# Patient Record
Sex: MALE | Race: WHITE
[De-identification: names, ages, dates, MRNs, and addresses within clinical notes are randomized per-mention and may not be internally consistent; named-entity substitution may affect disease eponyms.]

---

## 2017-01-18 ENCOUNTER — HOSPITAL ENCOUNTER (INPATIENT)
Dept: HOSPITAL 74 - YASAS | Age: 57
LOS: 5 days | Discharge: HOME | DRG: 773 | End: 2017-01-23
Attending: INTERNAL MEDICINE | Admitting: INTERNAL MEDICINE
Payer: COMMERCIAL

## 2017-01-18 VITALS — BODY MASS INDEX: 21.7 KG/M2

## 2017-01-18 DIAGNOSIS — M62.838: ICD-10-CM

## 2017-01-18 DIAGNOSIS — F11.23: Primary | ICD-10-CM

## 2017-01-18 DIAGNOSIS — M25.562: ICD-10-CM

## 2017-01-18 DIAGNOSIS — Z87.898: ICD-10-CM

## 2017-01-18 DIAGNOSIS — F17.210: ICD-10-CM

## 2017-01-18 DIAGNOSIS — F19.24: ICD-10-CM

## 2017-01-18 DIAGNOSIS — G89.29: ICD-10-CM

## 2017-01-18 DIAGNOSIS — G47.00: ICD-10-CM

## 2017-01-18 LAB
APPEARANCE UR: CLEAR
BILIRUB UR STRIP.AUTO-MCNC: NEGATIVE MG/DL
COLOR UR: YELLOW
HIV 1 & 2 AB: NEGATIVE
HIV 1 AGP24: NEGATIVE
KETONES UR QL STRIP: NEGATIVE
LEUKOCYTE ESTERASE UR QL STRIP.AUTO: NEGATIVE
NITRITE UR QL STRIP: NEGATIVE
PH UR: 5 [PH] (ref 5–8)
PROT UR QL STRIP: NEGATIVE
PROT UR QL STRIP: NEGATIVE
RBC # UR STRIP: NEGATIVE /UL
SP GR UR: 1.02 (ref 1–1.03)
UROBILINOGEN UR STRIP-MCNC: NEGATIVE E.U./DL (ref 0.2–1)

## 2017-01-18 PROCEDURE — HZ2ZZZZ DETOXIFICATION SERVICES FOR SUBSTANCE ABUSE TREATMENT: ICD-10-PCS | Performed by: INTERNAL MEDICINE

## 2017-01-18 RX ADMIN — ANALGESIC BALM SCH APPLIC: 1.74; 4.06 OINTMENT TOPICAL at 22:39

## 2017-01-18 RX ADMIN — Medication SCH MG: at 22:40
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
## 2017-01-19 LAB
ALBUMIN SERPL-MCNC: 4.6 G/DL (ref 3.4–5)
ALP SERPL-CCNC: 50 U/L (ref 45–117)
ALT SERPL-CCNC: 18 U/L (ref 12–78)
ANION GAP SERPL CALC-SCNC: 6 MMOL/L (ref 8–16)
AST SERPL-CCNC: 15 U/L (ref 15–37)
BILIRUB SERPL-MCNC: 0.5 MG/DL (ref 0.2–1)
CALCIUM SERPL-MCNC: 8.6 MG/DL (ref 8.5–10.1)
CO2 SERPL-SCNC: 30 MMOL/L (ref 21–32)
CREAT SERPL-MCNC: 1 MG/DL (ref 0.7–1.3)
DEPRECATED RDW RBC AUTO: 13.7 % (ref 11.9–15.9)
GLUCOSE SERPL-MCNC: 87 MG/DL (ref 74–106)
MCH RBC QN AUTO: 32.6 PG (ref 25.7–33.7)
MCHC RBC AUTO-ENTMCNC: 33.7 G/DL (ref 32–35.9)
MCV RBC: 96.7 FL (ref 80–96)
PLATELET # BLD AUTO: 188 K/MM3 (ref 134–434)
PMV BLD: 9.1 FL (ref 7.5–11.1)
PROT SERPL-MCNC: 7.6 G/DL (ref 6.4–8.2)
WBC # BLD AUTO: 6.7 K/MM3 (ref 4–10)

## 2017-01-19 RX ADMIN — ANALGESIC BALM SCH APPLIC: 1.74; 4.06 OINTMENT TOPICAL at 22:35

## 2017-01-19 RX ADMIN — Medication SCH MG: at 22:35

## 2017-01-19 RX ADMIN — Medication SCH TAB: at 10:33

## 2017-01-19 RX ADMIN — ANALGESIC BALM SCH APPLIC: 1.74; 4.06 OINTMENT TOPICAL at 10:33

## 2017-01-19 RX ADMIN — NICOTINE SCH: 21 PATCH TRANSDERMAL at 10:34
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
## 2017-01-19 NOTE — EKG
Test Reason : 

Blood Pressure : ***/*** mmHG

Vent. Rate : 052 BPM     Atrial Rate : 052 BPM

   P-R Int : 172 ms          QRS Dur : 088 ms

    QT Int : 454 ms       P-R-T Axes : 072 062 066 degrees

   QTc Int : 422 ms

 

SINUS BRADYCARDIA

OTHERWISE NORMAL ECG

NO PREVIOUS ECGS AVAILABLE

Confirmed by EVAN DIAZ, BEL ([id number]) on 1/19/2017 5:02:26 PM

 

Referred By: Cristhian Daigle           Confirmed By:BEL GORDON MD

## 2017-01-20 RX ADMIN — NICOTINE SCH: 21 PATCH TRANSDERMAL at 11:09

## 2017-01-20 RX ADMIN — ANALGESIC BALM SCH: 1.74; 4.06 OINTMENT TOPICAL at 22:38

## 2017-01-20 RX ADMIN — Medication SCH MG: at 22:38

## 2017-01-20 RX ADMIN — Medication SCH TAB: at 11:09

## 2017-01-20 RX ADMIN — ANALGESIC BALM SCH: 1.74; 4.06 OINTMENT TOPICAL at 11:10

## 2017-01-20 NOTE — PN
[facility] COWS





- Scale


Resting Pulse: 0= [place] 80 or Below


Sweatin= Chills/Flushing


Restless Observation: 3= Extraneous Movement


Pupil Size: 2= Moderately Dilated


Bone or Joint Aches: 4=Acute Joint/Muscle Pain


Runny Nose/ Eye Tearin= Nasal Congestion


GI Upset > 30mins: 1= Stomach Cramp


Tremor Observation of Outstretched Hands: 2= Slight Tremor Visible


Yawning Observation: 2= >3x During Session


Anxiety or Irritability: 2=Irritable/Anxious


Goose Flesh Skin: 0=Smooth Skin


COWS Score: 18





BHS Progress Note (SOAP)


Subjective: 


ANXIETY,SWEATS,INTERMITTENT SLEEP


Objective: 





17 11:07


 Vital Signs











Temperature  97.3 F L  17 09:37


 


Pulse Rate  54 L  17 09:37


 


Respiratory Rate  18   17 09:37


 


Blood Pressure  99/53   17 09:37


 


O2 Sat by Pulse Oximetry (%)      








 Laboratory Last Values











WBC  6.7 K/mm3 (4.0-10.0)  D 17  05:45    


 


RBC  4.22 M/mm3 (4.00-5.60)   17  05:45    


 


Hgb  13.7 GM/dL (11.7-16.9)   17  05:45    


 


Hct  40.8 % (35.4-49)   17  05:45    


 


MCV  96.7 fl (80-96)  H  17  05:45    


 


MCHC  33.7 g/dl (32.0-35.9)   17  05:45    


 


RDW  13.7 % (11.9-15.9)   17  05:45    


 


Plt Count  188 K/MM3 (134-434)   17  05:45    


 


MPV  9.1 fl (7.5-11.1)   17  05:45    


 


Sodium  141 mmol/L (136-145)   17  05:45    


 


Potassium  4.5 mmol/L (3.5-5.1)   17  05:45    


 


Chloride  105 mmol/L ()   17  05:45    


 


Carbon Dioxide  30 mmol/L (21-32)   17  05:45    


 


Anion Gap  6  (8-16)  L  17  05:45    


 


BUN  15 mg/dL (7-18)   17  05:45    


 


Creatinine  1.0 mg/dL (0.7-1.3)   17  05:45    


 


Creat Clearance w eGFR  > 60  (>60)   17  05:45    


 


Random Glucose  87 mg/dL ()   17  05:45    


 


Calcium  8.6 mg/dL (8.5-10.1)   17  05:45    


 


Total Bilirubin  0.5 mg/dL (0.2-1.0)  D 17  05:45    


 


AST  15 U/L (15-37)  D 17  05:45    


 


ALT  18 U/L (12-78)   17  05:45    


 


Alkaline Phosphatase  50 U/L ()   17  05:45    


 


Total Protein  7.6 g/dl (6.4-8.2)   17  05:45    


 


Albumin  4.6 g/dl (3.4-5.0)   17  05:45    


 


Urine Color  Yellow   17  14:00    


 


Urine Appearance  Clear   17  14:00    


 


Urine pH  5.0  (5.0-8.0)   17  14:00    


 


Ur Specific Gravity  1.025  (1.001-1.035)   17  14:00    


 


Urine Protein  Negative  (NEGATIVE)   17  14:00    


 


Urine Glucose (UA)  Negative  (NEGATIVE)   17  14:00    


 


Urine Ketones  Negative  (NEGATIVE)   17  14:00    


 


Urine Blood  Negative  (NEGATIVE)   17  14:00    


 


Urine Nitrite  Negative  (NEGATIVE)   17  14:00    


 


Urine Bilirubin  Negative  (NEGATIVE)   17  14:00    


 


Urine Urobilinogen  Negative E.U./dl (0.2-1.0)   17  14:00    


 


Ur Leukocyte Esterase  Negative  (NEGATIVE)   17  14:00    


 


RPR Titer  Nonreactive  (NONREACTIVE)   17  05:45    


 


HIV 1&2 Antibody Screen  Negative   17  12:40    


 


HIV P24 Antigen  Negative   17  12:40    











Assessment: 





17 11:07


WITHDRAWAL SX


Plan: 


CONTINUE DETOX

## 2017-01-21 RX ADMIN — NICOTINE SCH: 21 PATCH TRANSDERMAL at 11:00

## 2017-01-21 RX ADMIN — ANALGESIC BALM SCH: 1.74; 4.06 OINTMENT TOPICAL at 22:23

## 2017-01-21 RX ADMIN — Medication SCH MG: at 22:23

## 2017-01-21 RX ADMIN — Medication SCH TAB: at 10:46

## 2017-01-21 RX ADMIN — ANALGESIC BALM SCH APPLIC: 1.74; 4.06 OINTMENT TOPICAL at 10:47

## 2017-01-21 NOTE — PN
[facility] Progress Note (SOAP)


Subjective: 


knee pain, irritability


Objective: 





01/21/17 11:43


 Vital Signs - 8 hr











  01/21/17 01/21/17





  06:33 10:51


 


Temperature 96.9 F L 97 F L


 


Pulse Rate 52 L 56 L


 


Respiratory 18 20





Rate  


 


Blood Pressure 98/59 90/60








 Laboratory Last Values











WBC  6.7 K/mm3 (4.0-10.0)  D 01/19/17  05:45    


 


RBC  4.22 M/mm3 (4.00-5.60)   01/19/17  05:45    


 


Hgb  13.7 GM/dL (11.7-16.9)   01/19/17  05:45    


 


Hct  40.8 % (35.4-49)   01/19/17  05:45    


 


MCV  96.7 fl (80-96)  H  01/19/17  05:45    


 


MCHC  33.7 g/dl (32.0-35.9)   01/19/17  05:45    


 


RDW  13.7 % (11.9-15.9)   01/19/17  05:45    


 


Plt Count  188 K/MM3 (134-434)   01/19/17  05:45    


 


MPV  9.1 fl (7.5-11.1)   01/19/17  05:45    


 


Sodium  141 mmol/L (136-145)   01/19/17  05:45    


 


Potassium  4.5 mmol/L (3.5-5.1)   01/19/17  05:45    


 


Chloride  105 mmol/L ()   01/19/17  05:45    


 


Carbon Dioxide  30 mmol/L (21-32)   01/19/17  05:45    


 


Anion Gap  6  (8-16)  L  01/19/17  05:45    


 


BUN  15 mg/dL (7-18)   01/19/17  05:45    


 


Creatinine  1.0 mg/dL (0.7-1.3)   01/19/17  05:45    


 


Creat Clearance w eGFR  > 60  (>60)   01/19/17  05:45    


 


Random Glucose  87 mg/dL ()   01/19/17  05:45    


 


Calcium  8.6 mg/dL (8.5-10.1)   01/19/17  05:45    


 


Total Bilirubin  0.5 mg/dL (0.2-1.0)  D 01/19/17  05:45    


 


AST  15 U/L (15-37)  D 01/19/17  05:45    


 


ALT  18 U/L (12-78)   01/19/17  05:45    


 


Alkaline Phosphatase  50 U/L ()   01/19/17  05:45    


 


Total Protein  7.6 g/dl (6.4-8.2)   01/19/17  05:45    


 


Albumin  4.6 g/dl (3.4-5.0)   01/19/17  05:45    


 


Urine Color  Yellow   01/18/17  14:00    


 


Urine Appearance  Clear   01/18/17  14:00    


 


Urine pH  5.0  (5.0-8.0)   01/18/17  14:00    


 


Ur Specific Gravity  1.025  (1.001-1.035)   01/18/17  14:00    


 


Urine Protein  Negative  (NEGATIVE)   01/18/17  14:00    


 


Urine Glucose (UA)  Negative  (NEGATIVE)   01/18/17  14:00    


 


Urine Ketones  Negative  (NEGATIVE)   01/18/17  14:00    


 


Urine Blood  Negative  (NEGATIVE)   01/18/17  14:00    


 


Urine Nitrite  Negative  (NEGATIVE)   01/18/17  14:00    


 


Urine Bilirubin  Negative  (NEGATIVE)   01/18/17  14:00    


 


Urine Urobilinogen  Negative E.U./dl (0.2-1.0)   01/18/17  14:00    


 


Ur Leukocyte Esterase  Negative  (NEGATIVE)   01/18/17  14:00    


 


RPR Titer  Nonreactive  (NONREACTIVE)   01/19/17  05:45    


 


HIV 1&2 Antibody Screen  Negative   01/18/17  12:40    


 


HIV P24 Antigen  Negative   01/18/17  12:40    








Labs noted


Assessment: 





01/21/17 11:44


withdrawal sx


Plan: 


continue detox

## 2017-01-22 RX ADMIN — Medication SCH TAB: at 10:40

## 2017-01-22 RX ADMIN — Medication SCH MG: at 22:23

## 2017-01-22 RX ADMIN — ANALGESIC BALM SCH APPLIC: 1.74; 4.06 OINTMENT TOPICAL at 10:40

## 2017-01-22 RX ADMIN — ANALGESIC BALM SCH: 1.74; 4.06 OINTMENT TOPICAL at 22:23

## 2017-01-22 RX ADMIN — CYCLOBENZAPRINE HYDROCHLORIDE SCH MG: 10 TABLET, FILM COATED ORAL at 22:23

## 2017-01-22 RX ADMIN — NICOTINE SCH: 21 PATCH TRANSDERMAL at 10:40

## 2017-01-22 NOTE — PN
[facility] Progress Note (SOAP)


Subjective: 


Anxious, sweating, interrupted sleep, b/l hamstring pain/spasm of 5/10)


Objective: 





01/22/17 14:44


 Last Vital Signs











Temp Pulse Resp BP Pulse Ox


 


 97 F L  62   19   109/70    


 


 01/22/17 11:35  01/22/17 14:40  01/22/17 14:40  01/22/17 14:40   








 Laboratory Tests











  01/18/17 01/18/17 01/19/17





  12:40 14:00 05:45


 


WBC    6.7  D


 


RBC    4.22


 


Hgb    13.7


 


Hct    40.8


 


MCV    96.7 H


 


MCHC    33.7


 


RDW    13.7


 


Plt Count    188


 


MPV    9.1


 


Sodium   


 


Potassium   


 


Chloride   


 


Carbon Dioxide   


 


Anion Gap   


 


BUN   


 


Creatinine   


 


Creat Clearance w eGFR   


 


Random Glucose   


 


Calcium   


 


Total Bilirubin   


 


AST   


 


ALT   


 


Alkaline Phosphatase   


 


Total Protein   


 


Albumin   


 


Urine Color   Yellow 


 


Urine Appearance   Clear 


 


Urine pH   5.0 


 


Ur Specific Gravity   1.025 


 


Urine Protein   Negative 


 


Urine Glucose (UA)   Negative 


 


Urine Ketones   Negative 


 


Urine Blood   Negative 


 


Urine Nitrite   Negative 


 


Urine Bilirubin   Negative 


 


Urine Urobilinogen   Negative 


 


Ur Leukocyte Esterase   Negative 


 


RPR Titer   


 


HIV 1&2 Antibody Screen  Negative  


 


HIV P24 Antigen  Negative  














  01/19/17 01/19/17





  05:45 05:45


 


WBC  


 


RBC  


 


Hgb  


 


Hct  


 


MCV  


 


MCHC  


 


RDW  


 


Plt Count  


 


MPV  


 


Sodium  141 


 


Potassium  4.5 


 


Chloride  105 


 


Carbon Dioxide  30 


 


Anion Gap  6 L 


 


BUN  15 


 


Creatinine  1.0 


 


Creat Clearance w eGFR  > 60 


 


Random Glucose  87 


 


Calcium  8.6 


 


Total Bilirubin  0.5  D 


 


AST  15  D 


 


ALT  18 


 


Alkaline Phosphatase  50 


 


Total Protein  7.6 


 


Albumin  4.6 


 


Urine Color  


 


Urine Appearance  


 


Urine pH  


 


Ur Specific Gravity  


 


Urine Protein  


 


Urine Glucose (UA)  


 


Urine Ketones  


 


Urine Blood  


 


Urine Nitrite  


 


Urine Bilirubin  


 


Urine Urobilinogen  


 


Ur Leukocyte Esterase  


 


RPR Titer   Nonreactive


 


HIV 1&2 Antibody Screen  


 


HIV P24 Antigen  








Labs noted


Assessment: 





01/22/17 14:44


Withdrawal symptoms





C/O muscle spasm


Plan: 


Continue detox





Muscle spasm: flexeril 10mg PO TID, continue to monitor

## 2017-01-23 VITALS — DIASTOLIC BLOOD PRESSURE: 62 MMHG | TEMPERATURE: 97.1 F | SYSTOLIC BLOOD PRESSURE: 96 MMHG | HEART RATE: 94 BPM

## 2017-01-23 RX ADMIN — CYCLOBENZAPRINE HYDROCHLORIDE SCH MG: 10 TABLET, FILM COATED ORAL at 05:29

## 2017-02-08 ENCOUNTER — HOSPITAL ENCOUNTER (INPATIENT)
Dept: HOSPITAL 74 - YASAS | Age: 57
LOS: 19 days | Discharge: HOME | DRG: 772 | End: 2017-02-27
Attending: PSYCHIATRY & NEUROLOGY | Admitting: PSYCHIATRY & NEUROLOGY
Payer: COMMERCIAL

## 2017-02-08 VITALS — BODY MASS INDEX: 21.6 KG/M2

## 2017-02-08 DIAGNOSIS — F19.282: ICD-10-CM

## 2017-02-08 DIAGNOSIS — F11.23: Primary | ICD-10-CM

## 2017-02-08 DIAGNOSIS — R63.4: ICD-10-CM

## 2017-02-08 DIAGNOSIS — F19.24: ICD-10-CM

## 2017-02-08 DIAGNOSIS — F17.210: ICD-10-CM

## 2017-02-08 LAB
APPEARANCE UR: CLEAR
BILIRUB UR STRIP.AUTO-MCNC: NEGATIVE MG/DL
COLOR UR: (no result)
HYALINE CASTS URNS QL MICRO: 1 /LPF
KETONES UR QL STRIP: NEGATIVE
LEUKOCYTE ESTERASE UR QL STRIP.AUTO: NEGATIVE
MUCOUS THREADS URNS QL MICRO: (no result)
NITRITE UR QL STRIP: NEGATIVE
PH UR: 5 [PH] (ref 5–8)
PROT UR QL STRIP: NEGATIVE
PROT UR QL STRIP: NEGATIVE
RBC # BLD AUTO: 1 /HPF (ref 0–3)
RBC # UR STRIP: (no result) /UL
SP GR UR: 1.01 (ref 1–1.03)
UROBILINOGEN UR STRIP-MCNC: NEGATIVE E.U./DL (ref 0.2–1)
WBC # UR AUTO: 1 /HPF (ref 3–5)

## 2017-02-08 RX ADMIN — ANALGESIC BALM SCH APPLIC: 1.74; 4.06 OINTMENT TOPICAL at 22:07

## 2017-02-08 RX ADMIN — Medication SCH MG: at 22:07

## 2017-02-08 NOTE — HP
Admission ROS [facility]


Chief Complaint: 


I AM HERE FOR REHAB FROM HEROIN


Allergies/Adverse Reactions: 


 Allergies











Allergy/AdvReac Type Severity Reaction Status Date / Time


 


No Known Allergies Allergy   Verified 17 11:33











History of Present Illness: 


THIS 56 YEARS OLD MALE WITH HEROIN DEPENDENCE,ADMITTED IN DETOX FROM 17 

TO 17


LONGEST PERIOD OF SOBRIETY 2 YEARS


INSOMNIA


NICOTINE DEPENDENCE


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





Exam Limitations: No Limitations





- Ebola screening


Have you traveled outside of the country in the last 21 days: No


Have you had contact with anyone from an Ebola affected area: No


Have you been sick,other than usual withdrawal symptoms: No





- Review of Systems


Constitutional: No Symptoms Reported


EENT: reports: No Symptoms Reported


Respiratory: reports: No Symptoms reported


Cardiac: reports: No Symptoms Reported


GI: reports: No Symptoms Reported


: reports: No Symptoms Reported


Musculoskeletal: reports: No Symptoms Reported, Other (OLD INJURY LEFT KNEE IN 

)


Integumentary: reports: No Symptoms Reported


Neuro: reports: No Symptoms reported


Endocrine: reports: No Symptoms Reported


Hematology: reports: No Symptoms Reported


Psychiatric: reports: other (INSOMNIA)





Patient History





- Patient Medical History


Hx Anemia: No


Hx Asthma: No


Hx Chronic Obstructive Pulmonary Disease (COPD): No


Hx Cancer: No


Hx Cardiac Disorders: No


Hx Congestive Heart Failure: No


Hx Hypertension: No


Hx Hypercholesterolemia: No


Hx Pacemaker: No


HX Cerebrovascular Accident: Yes (possible mild stroke 10yrs ago no residuals 

no )


Hx Seizures: No


Hx Dementia: No


Hx Diabetes: No


Hx Gastrointestinal Disorders: No


Hx Liver Disease: No


Hx Genitourinary Disorders: No


Hx Sexually Transmitted Disorders: No


Hx Renal Disease (ESRD): No


Hx Thyroid Disease: No


Hx Human Immunodeficiency Virus (HIV): No (LAST  NEGATIVE)


Hx Hepatitis C: No


Hx Depression: No


Hx Suicide Attempt: No


Hx Bipolar Disorder: No


Hx Schizophrenia: No


Other Medical History: NO SUICIDAL,NO HOMICIDAL





- Patient Surgical History


Past Surgical History: No


Hx Neurologic Surgery: No


Hx Cataract Extraction: No


Hx Cardiac Surgery: No


Hx Lung Surgery: No


Hx Breast Surgery: No


Hx Breast Biopsy: No


Hx Abdominal Surgery: No


Hx Appendectomy: No


Hx Cholecystectomy: No


Hx Genitourinary Surgery: No


Hx  Section: No


Hx Orthopedic Surgery: No


Anesthesia Reaction: No





- PPD History


Previous Implant?: Yes


Documented Results: Negative w/proof


Implanted On Prior R Admission?: No


Date: 17


Results: 0 MM


PPD to be Administered?: No





- Smoking Cessation


Smoking history: Current every day smoker


Aproximately how many cigarettes per day: 6


Cigars Per Day: 0


Hx Chewing Tobacco Use: No


Initiated information on smoking cessation: Yes


'Breaking Loose' booklet given: 17





- Substance & Tx. History


Hx Alcohol Use: No


Hx Substance Use: Yes


Substance Use Type: Heroin


Hx Substance Use Treatment: Yes ([facility] 17 TO 17)





- Substances Abused


  ** Heroin


Route: Inhalation


Frequency: Daily


Amount used: 3 TO 5 BAGS


Age of first use: 20


Date of Last Use: 17





Family Disease History





- Family Disease History


Family History: Denies





Admission Physical Exam [facility]





- Vital Signs


Vital Signs: 


 Vital Signs - 24 hr











  17





  11:20


 


Temperature 97.6 F


 


Pulse Rate 58 L


 


Respiratory 18





Rate 


 


Blood Pressure 124/76














- Physical


General Appearance: Yes: Within Normal Limits


HEENTM: Yes: Within Normal Limits


Respiratory: Yes: Lungs Clear


Neck: Yes: Within Normal Limits


Breast: Yes: Within Normal Limits


Cardiology: Yes: Within Normal Limits, Regular Rhythm, Regular Rate, S1, S2


Abdominal: Yes: Normal Bowel Sounds, Non Tender, Flat, Soft


Genitourinary: Yes: Within Normal Limits


Back: Yes: Within Normal Limits


Musculoskeletal: Yes: Within Normal Limits


Extremities: Yes: Within Normal Limits, Other (OLD INJURY OF LEFT KNEE)


Neurological: Yes: CNs II-XII NML intact, Fully Oriented, Alert, Motor Strength 

5/5


Integumentary: Yes: Within Normal Limits


Lymphatic: Yes: Within Normal Limits





- Diagnostic


(1) Nicotine dependence


Current Visit: No   Status: Chronic   Qualifiers: 


     Nicotine product type: cigarettes     Substance use status: uncomplicated 

       Qualified Code(s): F17.210 - Nicotine dependence, cigarettes, 

uncomplicated  





(2) Weight loss


Current Visit: No   Status: Chronic





(3) Opioid dependence


Current Visit: Yes   Status: Acute   





(4) Left knee injury


Current Visit: Yes   Status: Acute   








Cleared for Admission S





- Detox or Rehab


Claeared for Rehab Admission: Yes





[facility] Breath Alcohol Content


Breath Alcohol Content: 0





Urine Drug Screen





- Results


Drug Screen Negative: No


Urine Drug Screen Results: OPI-Opiates

## 2017-02-08 NOTE — EKG
Test Reason : 

Blood Pressure : ***/*** mmHG

Vent. Rate : 053 BPM     Atrial Rate : 053 BPM

   P-R Int : 166 ms          QRS Dur : 090 ms

    QT Int : 450 ms       P-R-T Axes : 072 070 068 degrees

   QTc Int : 422 ms

 

*** POOR DATA QUALITY, INTERPRETATION MAY BE ADVERSELY AFFECTED

SINUS BRADYCARDIA

 

Confirmed by EVAN DIAZ, BEL ([id number]) on 2/8/2017 11:07:05 PM

 

Referred By: Rafaela De Paz           Confirmed By:BEL GORDON MD

## 2017-02-09 LAB
APPEARANCE UR: CLEAR
BILIRUB UR STRIP.AUTO-MCNC: NEGATIVE MG/DL
COLOR UR: (no result)
KETONES UR QL STRIP: NEGATIVE
LEUKOCYTE ESTERASE UR QL STRIP.AUTO: NEGATIVE
NITRITE UR QL STRIP: NEGATIVE
PH UR: 7 [PH] (ref 5–8)
PROT UR QL STRIP: NEGATIVE
PROT UR QL STRIP: NEGATIVE
RBC # UR STRIP: NEGATIVE /UL
SP GR UR: 1.01 (ref 1–1.03)
UROBILINOGEN UR STRIP-MCNC: NEGATIVE E.U./DL (ref 0.2–1)

## 2017-02-09 RX ADMIN — Medication SCH TAB: at 09:53

## 2017-02-09 RX ADMIN — ACETAMINOPHEN PRN MG: 325 TABLET ORAL at 10:58

## 2017-02-09 RX ADMIN — Medication SCH MG: at 21:06

## 2017-02-09 RX ADMIN — ANALGESIC BALM SCH APPLIC: 1.74; 4.06 OINTMENT TOPICAL at 09:53

## 2017-02-09 RX ADMIN — ANALGESIC BALM SCH APPLIC: 1.74; 4.06 OINTMENT TOPICAL at 21:04
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
## 2017-02-09 NOTE — PN
[facility] Progress Note


Note: 


received nurse call


patient has muscle cramp


trail of flexeril 10 mg po x 1


continue rehab

## 2017-02-10 RX ADMIN — ANALGESIC BALM SCH APPLIC: 1.74; 4.06 OINTMENT TOPICAL at 21:35

## 2017-02-10 RX ADMIN — IBUPROFEN PRN MG: 400 TABLET, FILM COATED ORAL at 08:24

## 2017-02-10 RX ADMIN — ANALGESIC BALM SCH APPLIC: 1.74; 4.06 OINTMENT TOPICAL at 10:11

## 2017-02-10 RX ADMIN — Medication SCH TAB: at 10:11

## 2017-02-10 RX ADMIN — CYCLOBENZAPRINE HYDROCHLORIDE PRN MG: 10 TABLET, FILM COATED ORAL at 15:41

## 2017-02-10 RX ADMIN — CYCLOBENZAPRINE HYDROCHLORIDE PRN MG: 10 TABLET, FILM COATED ORAL at 23:28

## 2017-02-10 RX ADMIN — Medication SCH MG: at 21:35

## 2017-02-10 RX ADMIN — IBUPROFEN PRN MG: 400 TABLET, FILM COATED ORAL at 15:41

## 2017-02-11 RX ADMIN — ACETAMINOPHEN PRN MG: 325 TABLET ORAL at 19:09

## 2017-02-11 RX ADMIN — IBUPROFEN PRN MG: 400 TABLET, FILM COATED ORAL at 07:43

## 2017-02-11 RX ADMIN — HYDROXYZINE PAMOATE PRN MG: 50 CAPSULE ORAL at 19:09

## 2017-02-11 RX ADMIN — CYCLOBENZAPRINE HYDROCHLORIDE PRN MG: 10 TABLET, FILM COATED ORAL at 22:00

## 2017-02-11 RX ADMIN — Medication SCH TAB: at 09:59

## 2017-02-11 RX ADMIN — ANALGESIC BALM SCH APPLIC: 1.74; 4.06 OINTMENT TOPICAL at 09:59

## 2017-02-11 RX ADMIN — ANALGESIC BALM SCH APPLIC: 1.74; 4.06 OINTMENT TOPICAL at 22:00

## 2017-02-11 RX ADMIN — CYCLOBENZAPRINE HYDROCHLORIDE PRN MG: 10 TABLET, FILM COATED ORAL at 10:00

## 2017-02-11 RX ADMIN — Medication SCH MG: at 22:00

## 2017-02-12 RX ADMIN — Medication SCH TAB: at 09:50

## 2017-02-12 RX ADMIN — ANALGESIC BALM SCH APPLIC: 1.74; 4.06 OINTMENT TOPICAL at 22:10

## 2017-02-12 RX ADMIN — Medication SCH MG: at 22:10

## 2017-02-12 RX ADMIN — ACETAMINOPHEN PRN MG: 325 TABLET ORAL at 09:50

## 2017-02-12 RX ADMIN — CYCLOBENZAPRINE HYDROCHLORIDE PRN MG: 10 TABLET, FILM COATED ORAL at 22:10

## 2017-02-12 RX ADMIN — HYDROXYZINE PAMOATE PRN MG: 50 CAPSULE ORAL at 02:41

## 2017-02-12 RX ADMIN — HYDROXYZINE PAMOATE PRN MG: 50 CAPSULE ORAL at 17:21

## 2017-02-12 RX ADMIN — HYDROXYZINE PAMOATE PRN MG: 50 CAPSULE ORAL at 09:50

## 2017-02-12 RX ADMIN — ACETAMINOPHEN PRN MG: 325 TABLET ORAL at 17:22

## 2017-02-12 RX ADMIN — ANALGESIC BALM SCH: 1.74; 4.06 OINTMENT TOPICAL at 09:51

## 2017-02-12 RX ADMIN — ACETAMINOPHEN PRN MG: 325 TABLET ORAL at 02:39

## 2017-02-13 RX ADMIN — ANALGESIC BALM SCH APPLIC: 1.74; 4.06 OINTMENT TOPICAL at 22:00

## 2017-02-13 RX ADMIN — HYDROXYZINE PAMOATE PRN MG: 50 CAPSULE ORAL at 10:31

## 2017-02-13 RX ADMIN — ACETAMINOPHEN PRN MG: 325 TABLET ORAL at 03:06

## 2017-02-13 RX ADMIN — Medication SCH TAB: at 10:31

## 2017-02-13 RX ADMIN — Medication SCH MG: at 22:01

## 2017-02-13 RX ADMIN — ANALGESIC BALM SCH: 1.74; 4.06 OINTMENT TOPICAL at 10:33

## 2017-02-13 RX ADMIN — ACETAMINOPHEN PRN MG: 325 TABLET ORAL at 10:31

## 2017-02-13 RX ADMIN — HYDROXYZINE PAMOATE PRN MG: 50 CAPSULE ORAL at 22:01

## 2017-02-13 RX ADMIN — CYCLOBENZAPRINE HYDROCHLORIDE PRN MG: 10 TABLET, FILM COATED ORAL at 22:01

## 2017-02-14 RX ADMIN — CYCLOBENZAPRINE HYDROCHLORIDE PRN MG: 10 TABLET, FILM COATED ORAL at 22:00

## 2017-02-14 RX ADMIN — CYCLOBENZAPRINE HYDROCHLORIDE PRN MG: 10 TABLET, FILM COATED ORAL at 10:19

## 2017-02-14 RX ADMIN — ANALGESIC BALM SCH APPLIC: 1.74; 4.06 OINTMENT TOPICAL at 22:02

## 2017-02-14 RX ADMIN — ACETAMINOPHEN PRN MG: 325 TABLET ORAL at 10:19

## 2017-02-14 RX ADMIN — Medication SCH MG: at 21:59

## 2017-02-14 RX ADMIN — ANALGESIC BALM SCH APPLIC: 1.74; 4.06 OINTMENT TOPICAL at 10:19

## 2017-02-14 RX ADMIN — ACETAMINOPHEN PRN MG: 325 TABLET ORAL at 05:25

## 2017-02-14 RX ADMIN — Medication SCH TAB: at 10:19

## 2017-02-15 RX ADMIN — ANALGESIC BALM SCH APPLIC: 1.74; 4.06 OINTMENT TOPICAL at 09:30

## 2017-02-15 RX ADMIN — ACETAMINOPHEN PRN MG: 325 TABLET ORAL at 02:04

## 2017-02-15 RX ADMIN — CYCLOBENZAPRINE HYDROCHLORIDE PRN MG: 10 TABLET, FILM COATED ORAL at 21:58

## 2017-02-15 RX ADMIN — Medication SCH TAB: at 09:29

## 2017-02-15 RX ADMIN — ACETAMINOPHEN PRN MG: 325 TABLET ORAL at 09:31

## 2017-02-15 RX ADMIN — ANALGESIC BALM SCH APPLIC: 1.74; 4.06 OINTMENT TOPICAL at 21:58

## 2017-02-15 RX ADMIN — Medication SCH MG: at 21:58

## 2017-02-16 RX ADMIN — Medication SCH: at 22:16

## 2017-02-16 RX ADMIN — ANALGESIC BALM SCH APPLIC: 1.74; 4.06 OINTMENT TOPICAL at 10:07

## 2017-02-16 RX ADMIN — ANALGESIC BALM SCH APPLIC: 1.74; 4.06 OINTMENT TOPICAL at 22:16

## 2017-02-16 RX ADMIN — ACETAMINOPHEN PRN MG: 325 TABLET ORAL at 10:07

## 2017-02-16 RX ADMIN — CYCLOBENZAPRINE HYDROCHLORIDE PRN MG: 10 TABLET, FILM COATED ORAL at 22:15

## 2017-02-16 RX ADMIN — HYDROXYZINE PAMOATE PRN MG: 50 CAPSULE ORAL at 03:00

## 2017-02-16 RX ADMIN — Medication SCH TAB: at 10:07

## 2017-02-17 RX ADMIN — ANALGESIC BALM SCH APPLIC: 1.74; 4.06 OINTMENT TOPICAL at 10:29

## 2017-02-17 RX ADMIN — IBUPROFEN PRN MG: 400 TABLET, FILM COATED ORAL at 10:27

## 2017-02-17 RX ADMIN — Medication SCH: at 22:02

## 2017-02-17 RX ADMIN — Medication SCH TAB: at 10:27

## 2017-02-17 RX ADMIN — ANALGESIC BALM SCH APPLIC: 1.74; 4.06 OINTMENT TOPICAL at 22:02

## 2017-02-17 RX ADMIN — ACETAMINOPHEN PRN MG: 325 TABLET ORAL at 22:01

## 2017-02-18 RX ADMIN — IBUPROFEN PRN MG: 400 TABLET, FILM COATED ORAL at 10:10

## 2017-02-18 RX ADMIN — ACETAMINOPHEN PRN MG: 325 TABLET ORAL at 14:39

## 2017-02-18 RX ADMIN — ANALGESIC BALM SCH APPLIC: 1.74; 4.06 OINTMENT TOPICAL at 10:10

## 2017-02-18 RX ADMIN — ANALGESIC BALM SCH APPLIC: 1.74; 4.06 OINTMENT TOPICAL at 21:48

## 2017-02-18 RX ADMIN — CYCLOBENZAPRINE HYDROCHLORIDE PRN MG: 10 TABLET, FILM COATED ORAL at 21:48

## 2017-02-18 RX ADMIN — Medication SCH MG: at 21:48

## 2017-02-18 RX ADMIN — Medication SCH TAB: at 10:10

## 2017-02-19 RX ADMIN — CYCLOBENZAPRINE HYDROCHLORIDE PRN MG: 10 TABLET, FILM COATED ORAL at 06:35

## 2017-02-19 RX ADMIN — ANALGESIC BALM SCH APPLIC: 1.74; 4.06 OINTMENT TOPICAL at 10:21

## 2017-02-19 RX ADMIN — ACETAMINOPHEN PRN MG: 325 TABLET ORAL at 10:20

## 2017-02-19 RX ADMIN — Medication SCH TAB: at 10:20

## 2017-02-19 RX ADMIN — CYCLOBENZAPRINE HYDROCHLORIDE PRN MG: 10 TABLET, FILM COATED ORAL at 21:51

## 2017-02-19 RX ADMIN — ACETAMINOPHEN PRN MG: 325 TABLET ORAL at 06:32

## 2017-02-19 RX ADMIN — Medication SCH MG: at 21:52

## 2017-02-19 RX ADMIN — ANALGESIC BALM SCH APPLIC: 1.74; 4.06 OINTMENT TOPICAL at 21:52

## 2017-02-20 RX ADMIN — ACETAMINOPHEN PRN MG: 325 TABLET ORAL at 23:34

## 2017-02-20 RX ADMIN — ANALGESIC BALM SCH: 1.74; 4.06 OINTMENT TOPICAL at 22:59

## 2017-02-20 RX ADMIN — Medication SCH: at 23:00

## 2017-02-20 RX ADMIN — ACETAMINOPHEN PRN MG: 325 TABLET ORAL at 10:14

## 2017-02-20 RX ADMIN — ANALGESIC BALM SCH: 1.74; 4.06 OINTMENT TOPICAL at 10:14

## 2017-02-20 RX ADMIN — Medication SCH TAB: at 10:14

## 2017-02-21 RX ADMIN — ACETAMINOPHEN PRN MG: 325 TABLET ORAL at 21:54

## 2017-02-21 RX ADMIN — ANALGESIC BALM SCH APPLIC: 1.74; 4.06 OINTMENT TOPICAL at 10:22

## 2017-02-21 RX ADMIN — ANALGESIC BALM SCH: 1.74; 4.06 OINTMENT TOPICAL at 21:55

## 2017-02-21 RX ADMIN — Medication SCH: at 21:55

## 2017-02-21 RX ADMIN — ACETAMINOPHEN PRN MG: 325 TABLET ORAL at 06:04

## 2017-02-21 RX ADMIN — Medication SCH TAB: at 10:22

## 2017-02-22 RX ADMIN — ANALGESIC BALM SCH APPLIC: 1.74; 4.06 OINTMENT TOPICAL at 10:00

## 2017-02-22 RX ADMIN — ACETAMINOPHEN PRN MG: 325 TABLET ORAL at 06:50

## 2017-02-22 RX ADMIN — ACETAMINOPHEN PRN MG: 325 TABLET ORAL at 21:43

## 2017-02-22 RX ADMIN — Medication SCH MG: at 21:42

## 2017-02-22 RX ADMIN — ANALGESIC BALM SCH APPLIC: 1.74; 4.06 OINTMENT TOPICAL at 21:43

## 2017-02-22 RX ADMIN — HYDROXYZINE PAMOATE PRN MG: 50 CAPSULE ORAL at 21:43

## 2017-02-22 RX ADMIN — Medication SCH TAB: at 10:00

## 2017-02-23 RX ADMIN — ANALGESIC BALM SCH APPLIC: 1.74; 4.06 OINTMENT TOPICAL at 22:05

## 2017-02-23 RX ADMIN — ACETAMINOPHEN PRN MG: 325 TABLET ORAL at 22:03

## 2017-02-23 RX ADMIN — Medication SCH MG: at 22:03

## 2017-02-23 RX ADMIN — ACETAMINOPHEN PRN MG: 325 TABLET ORAL at 07:19

## 2017-02-23 RX ADMIN — Medication SCH TAB: at 09:59

## 2017-02-23 RX ADMIN — NALTREXONE HYDROCHLORIDE SCH MG: 50 TABLET, FILM COATED ORAL at 12:05

## 2017-02-23 RX ADMIN — ANALGESIC BALM SCH APPLIC: 1.74; 4.06 OINTMENT TOPICAL at 09:59

## 2017-02-23 NOTE — PN
Psychiatric Progress Note


Vital Signs: 


 Vital Signs











 Period  Temp  Pulse  Resp  BP Sys/Burns  Pulse Ox


 


 Last 24 Hr    73  18-18  127/69  











Date of Session: 02/23/17


Chief Complaint:: I want help with my craving


HPI: Patient addressing Opoid Dependence comorbid with Nicotine Dependence and 

Substance-induced Mood Disorder


Current Medications: 


Active Medications











Generic Name Dose Route Start Last Admin





  Trade Name Freq  PRN Reason Stop Dose Admin


 


Acetaminophen  650 mg 02/08/17 14:12 02/23/17 07:19





  Tylenol -  PO   650 mg





  Q4H PRN   Administration





  PAIN   


 


Al Hydroxide/Mg Hydroxide  30 ml 02/08/17 14:12  





  Mylanta Oral Suspension -  PO   





  Q6H PRN   





  DYSPEPSIA   


 


Clonidine  0.1 mg 02/13/17 22:00 02/22/17 21:43





  Catapres -  PO   0.1 mg





  HS ASHUTOSH   Administration


 


Cyclobenzaprine HCl  10 mg 02/10/17 11:58 02/19/17 21:51





  Flexeril -  PO   10 mg





  TID PRN   Administration





  MUSCLE SPASMS   


 


Diphenhydramine HCl  50 mg 02/08/17 14:12 02/22/17 00:49





  Benadryl -  PO   50 mg





  HSMR1 PRN   Administration





  INSOMNIA   


 


Eucalyptus/Menthol/Phenol/Sorbitol  1 each 02/08/17 14:12  





  Cepastat Lozenge -  MM   





  Q4H PRN   





  SORE THROAT   


 


Guaifenesin  10 ml 02/08/17 14:12  





  Robitussin Dm -  PO   





  Q6H PRN   





  COUGH   


 


Hydroxyzine Pamoate  50 mg 02/08/17 14:12 02/22/17 21:43





  Vistaril -  PO   50 mg





  Q4H PRN   Administration





  AGITATION   


 


Ibuprofen  400 mg 02/08/17 14:12 02/18/17 10:10





  Motrin -  PO   400 mg





  Q6H PRN   Administration





  SEVERE PAIN   


 


Loperamide HCl  4 mg 02/08/17 14:12  





  Imodium -  PO   





  Q6H PRN   





  DIARRHEA   


 


Magnesium Citrate  300 ml 02/08/17 14:12  





  Citroma -  PO   





  Q48H PRN   





  CONSTIPATION   


 


Magnesium Hydroxide  30 ml 02/08/17 14:12  





  Milk Of Magnesia -  PO   





  DAILY PRN   





  CONSTIPATION   


 


Methyl Salicylate  1 applic 02/08/17 22:00 02/22/17 21:43





  Jet-Leos -  TP   1 applic





  BID ASHUTOSH   Administration


 


Nicotine Polacrilex  2 mg 02/08/17 14:12  





  Nicorette Gum -  BUC   





  Q2H PRN   





  NICOTINE REPLACEMENT RX   


 


Prenatal Multivit/Folic Acid/Iron  1 tab 02/09/17 10:00 02/22/17 10:00





  Prenatal Vitamins (Sjr) -  PO   1 tab





  DAILY ASHUTOSH   Administration


 


Pseudoephedrine/Triprolidine  1 combo 02/08/17 14:12  





  Actifed -  PO   





  TID PRN   





  NASAL CONGESTION   


 


Thiamine HCl  100 mg 02/08/17 22:00 02/22/17 21:42





  Vitamin B1 -  PO   100 mg





  HS ASHUTOSH   Administration


 


Trazodone HCl  100 mg 02/10/17 22:00 02/13/17 22:01





  Desyrel -  PO   100 mg





  HS PRN   Administration





  INSOMNIA   











Medication(s) Change(s): Start Naltrexone 50 mg po daily


Current Side Effect: No


Lab tests ordered: Yes


Lab tests reviewed: Yes


Provider note:: Discussed with patient about use of Naltrexone not only to 

alleviate craving for heroin but to prevent people addicted to heroin from 

taking them. Naltrexone -effects and drug interactions are also discussed. 

He is very much interested in that type of treatment and believes it will be 

beneficial towards his goal to maintain abstinent. Benefits vs Risks of 

medications discussed with patient. LFT's is within normal limit. Will start 

Naltrexone 50 mg po daily


Total face to face time:: 35





Mental Status Exam





- Mental Status Exam


Alert and Oriented to: Time, Place, Person


Cognitive Function: Fair


Patient Appearance: Well Groomed


Mood: Hopeful, Euthymic


Affect: Appropriate


Patient Behavior: Cooperative


Speech Pattern: Clear


Voice Loudness: Normal


Thought Process: Intact


Thought Disorder: Not Present


Hallucinations: Denies


Suicidal Ideation: Denies


Homicidal Ideation: Denies


Insight/Judgement: Fair


Sleep: Fair


Appetite: Good


Muscle strength/Tone: Normal


Gait/Station: Normal





Psychiatric Treatment Plan





- Problem List


(1) Opioid dependence


Current Visit: Yes   





(2) Nicotine dependence


Current Visit: No   Qualifiers: 


     Nicotine product type: cigarettes     Substance use status: uncomplicated 

       Qualified Code(s): F17.210 - Nicotine dependence, cigarettes, 

uncomplicated  





(3) Substance-induced sleep disorder


Current Visit: Yes


Initial treatment plan: 1) Start Naltrexone 50 mg po daily.  2) Monitor progress

(will check tolerability with patient tomorrow)

## 2017-02-24 RX ADMIN — HYDROXYZINE PAMOATE PRN MG: 50 CAPSULE ORAL at 21:39

## 2017-02-24 RX ADMIN — ACETAMINOPHEN PRN MG: 325 TABLET ORAL at 10:59

## 2017-02-24 RX ADMIN — Medication SCH MG: at 21:39

## 2017-02-24 RX ADMIN — ANALGESIC BALM SCH APPLIC: 1.74; 4.06 OINTMENT TOPICAL at 21:39

## 2017-02-24 RX ADMIN — ANALGESIC BALM SCH: 1.74; 4.06 OINTMENT TOPICAL at 10:58

## 2017-02-24 RX ADMIN — Medication SCH TAB: at 10:58

## 2017-02-24 RX ADMIN — CYCLOBENZAPRINE HYDROCHLORIDE PRN MG: 10 TABLET, FILM COATED ORAL at 21:39

## 2017-02-24 RX ADMIN — NALTREXONE HYDROCHLORIDE SCH MG: 50 TABLET, FILM COATED ORAL at 10:58

## 2017-02-24 NOTE — PN
Psychiatric Progress Note


Vital Signs: 


 Vital Signs











 Period  Temp  Pulse  Resp  BP Sys/Burns  Pulse Ox


 


 Last 24 Hr  98.4 F-98.4 F  59-59  16-16  119-119/76-76  











Date of Session: 02/24/17


Chief Complaint:: Follow up Naltrexone treatment


HPI: Patient addressing Opoid Dependence comorbid with Nicotine Dependence and 

Substance-induced Mood Disorder


ROS: Unremarkable


Current Medications: 


Active Medications











Generic Name Dose Route Start Last Admin





  Trade Name Freq  PRN Reason Stop Dose Admin


 


Acetaminophen  650 mg 02/08/17 14:12 02/24/17 10:59





  Tylenol -  PO   650 mg





  Q4H PRN   Administration





  PAIN   


 


Al Hydroxide/Mg Hydroxide  30 ml 02/08/17 14:12  





  Mylanta Oral Suspension -  PO   





  Q6H PRN   





  DYSPEPSIA   


 


Clonidine  0.1 mg 02/13/17 22:00 02/23/17 22:03





  Catapres -  PO   0.1 mg





  HS ASHUTOSH   Administration


 


Cyclobenzaprine HCl  10 mg 02/10/17 11:58 02/19/17 21:51





  Flexeril -  PO   10 mg





  TID PRN   Administration





  MUSCLE SPASMS   


 


Diphenhydramine HCl  50 mg 02/08/17 14:12 02/24/17 01:17





  Benadryl -  PO   50 mg





  HSMR1 PRN   Administration





  INSOMNIA   


 


Eucalyptus/Menthol/Phenol/Sorbitol  1 each 02/08/17 14:12  





  Cepastat Lozenge -  MM   





  Q4H PRN   





  SORE THROAT   


 


Guaifenesin  10 ml 02/08/17 14:12  





  Robitussin Dm -  PO   





  Q6H PRN   





  COUGH   


 


Hydroxyzine Pamoate  50 mg 02/08/17 14:12 02/22/17 21:43





  Vistaril -  PO   50 mg





  Q4H PRN   Administration





  AGITATION   


 


Ibuprofen  400 mg 02/08/17 14:12 02/18/17 10:10





  Motrin -  PO   400 mg





  Q6H PRN   Administration





  SEVERE PAIN   


 


Loperamide HCl  4 mg 02/08/17 14:12  





  Imodium -  PO   





  Q6H PRN   





  DIARRHEA   


 


Magnesium Citrate  300 ml 02/08/17 14:12  





  Citroma -  PO   





  Q48H PRN   





  CONSTIPATION   


 


Magnesium Hydroxide  30 ml 02/08/17 14:12  





  Milk Of Magnesia -  PO   





  DAILY PRN   





  CONSTIPATION   


 


Methyl Salicylate  1 applic 02/08/17 22:00 02/24/17 10:58





  Jet-Leos -  TP   Not Given





  BID ASHUTOSH   


 


Naltrexone HCl  50 mg 02/23/17 10:45 02/24/17 10:58





  Revia -  PO   50 mg





  DAILY ASHUTOSH   Administration


 


Nicotine Polacrilex  2 mg 02/08/17 14:12  





  Nicorette Gum -  BUC   





  Q2H PRN   





  NICOTINE REPLACEMENT RX   


 


Prenatal Multivit/Folic Acid/Iron  1 tab 02/09/17 10:00 02/24/17 10:58





  Prenatal Vitamins (Sjr) -  PO   1 tab





  DAILY ASHUTOSH   Administration


 


Pseudoephedrine/Triprolidine  1 combo 02/08/17 14:12  





  Actifed -  PO   





  TID PRN   





  NASAL CONGESTION   


 


Thiamine HCl  100 mg 02/08/17 22:00 02/23/17 22:03





  Vitamin B1 -  PO   100 mg





  HS ASHUTOSH   Administration


 


Trazodone HCl  100 mg 02/10/17 22:00 02/13/17 22:01





  Desyrel -  PO   100 mg





  HS PRN   Administration





  INSOMNIA   











Current Side Effect: No


Provider note:: Patient started treatment with Naltrexone 50 mg/day. Reports 

experiencing mild dizziness before going to bed last night and while in bed 

with eyes closed was seeing moving colors.Reports somewhat poor sleep last 

night.  He also reports mild diarrhea and yawning consistent with mild 

withdrawal symptoms as described in the pharmacy monograph. Overall, he is 

doing well on med despite these side-effects and mild withdrawal symptoms 

consistent with initiation of therapy with that medication descibed in the 

litterature or pamphlet.  Patient was educated about compliance and lifestyle 

changes. He was also made aware of drug interactions that can increase the risk 

of derious side-effects. He was given by nursing staff yesterday a pharmacy 

monograph regarding Naltrexone and he has been reading it as evidence by 

discussion with writer.


Total face to face time:: 35





Mental Status Exam





- Mental Status Exam


Alert and Oriented to: Time, Place, Person


Cognitive Function: Fair


Patient Appearance: Well Groomed


Mood: Hopeful, Euthymic


Affect: Appropriate


Patient Behavior: Cooperative


Speech Pattern: Clear


Voice Loudness: Normal


Thought Process: Intact


Thought Disorder: Not Present


Hallucinations: Denies


Suicidal Ideation: Denies


Homicidal Ideation: Denies


Insight/Judgement: Fair


Sleep: Fair


Appetite: Good


Muscle strength/Tone: Normal


Gait/Station: Normal





Psychiatric Treatment Plan





- Problem List


(1) Opioid dependence


Current Visit: Yes   





(2) Nicotine dependence


Current Visit: No   Qualifiers: 


     Nicotine product type: cigarettes     Substance use status: uncomplicated 

       Qualified Code(s): F17.210 - Nicotine dependence, cigarettes, 

uncomplicated  





(3) Substance-induced sleep disorder


Current Visit: Yes


Initial treatment plan: 1) Continue Naltrexone treatment.  2) Monitor progress

## 2017-02-25 RX ADMIN — ANALGESIC BALM SCH: 1.74; 4.06 OINTMENT TOPICAL at 09:51

## 2017-02-25 RX ADMIN — ACETAMINOPHEN PRN MG: 325 TABLET ORAL at 22:10

## 2017-02-25 RX ADMIN — ANALGESIC BALM SCH APPLIC: 1.74; 4.06 OINTMENT TOPICAL at 22:11

## 2017-02-25 RX ADMIN — NALTREXONE HYDROCHLORIDE SCH MG: 50 TABLET, FILM COATED ORAL at 09:51

## 2017-02-25 RX ADMIN — ACETAMINOPHEN PRN MG: 325 TABLET ORAL at 15:25

## 2017-02-25 RX ADMIN — Medication SCH TAB: at 09:51

## 2017-02-25 RX ADMIN — Medication SCH: at 22:11

## 2017-02-25 RX ADMIN — CYCLOBENZAPRINE HYDROCHLORIDE PRN MG: 10 TABLET, FILM COATED ORAL at 22:08

## 2017-02-26 RX ADMIN — ACETAMINOPHEN PRN MG: 325 TABLET ORAL at 21:50

## 2017-02-26 RX ADMIN — ACETAMINOPHEN PRN MG: 325 TABLET ORAL at 10:45

## 2017-02-26 RX ADMIN — ANALGESIC BALM SCH: 1.74; 4.06 OINTMENT TOPICAL at 22:37

## 2017-02-26 RX ADMIN — Medication SCH MG: at 21:50

## 2017-02-26 RX ADMIN — Medication SCH TAB: at 09:56

## 2017-02-26 RX ADMIN — ANALGESIC BALM SCH: 1.74; 4.06 OINTMENT TOPICAL at 09:56

## 2017-02-26 RX ADMIN — NALTREXONE HYDROCHLORIDE SCH: 50 TABLET, FILM COATED ORAL at 09:56

## 2017-02-27 VITALS — HEART RATE: 55 BPM | SYSTOLIC BLOOD PRESSURE: 112 MMHG | DIASTOLIC BLOOD PRESSURE: 75 MMHG | TEMPERATURE: 97.9 F

## 2017-02-27 PROCEDURE — HZ42ZZZ GROUP COUNSELING FOR SUBSTANCE ABUSE TREATMENT, COGNITIVE-BEHAVIORAL: ICD-10-PCS | Performed by: PSYCHIATRY & NEUROLOGY

## 2017-02-27 RX ADMIN — NALTREXONE HYDROCHLORIDE SCH: 50 TABLET, FILM COATED ORAL at 09:51

## 2017-02-27 RX ADMIN — Medication SCH TAB: at 09:50

## 2017-02-27 RX ADMIN — ANALGESIC BALM SCH: 1.74; 4.06 OINTMENT TOPICAL at 09:51

## 2017-02-27 NOTE — PN
Psychiatric Progress Note


Vital Signs: 


 Vital Signs











 Period  Temp  Pulse  Resp  BP Sys/Burns  Pulse Ox


 


 Last 24 Hr  97.9 F  55-63  16-20  112-123/73-75  











Date of Session: 02/27/17


Chief Complaint:: Discharge visit 


HPI: Case of a 55 y/o  male addressing,at [address],issues of 

opioid dependence and nicotine dependence co-morbid with substance-induced mood/

sleep disorder.According to the Multidisciplinary treatment team,this patient 

is scheduled for discharge on this date.


ROS: Unremarkable.Patient is ambulatory,conversant and cognitively intact.No 

somatic complaint offered.No evidence of distress.


Current Medications: 


Active Medications











Generic Name Dose Route Start Last Admin





  Trade Name Freq  PRN Reason Stop Dose Admin


 


Acetaminophen  650 mg 02/08/17 14:12 02/26/17 21:50





  Tylenol -  PO   650 mg





  Q4H PRN   Administration





  PAIN   


 


Al Hydroxide/Mg Hydroxide  30 ml 02/08/17 14:12  





  Mylanta Oral Suspension -  PO   





  Q6H PRN   





  DYSPEPSIA   


 


Clonidine  0.1 mg 02/13/17 22:00 02/26/17 21:50





  Catapres -  PO   0.1 mg





  HS ASHUTOSH   Administration


 


Cyclobenzaprine HCl  10 mg 02/10/17 11:58 02/25/17 22:08





  Flexeril -  PO   10 mg





  TID PRN   Administration





  MUSCLE SPASMS   


 


Diphenhydramine HCl  50 mg 02/08/17 14:12 02/26/17 21:50





  Benadryl -  PO   50 mg





  HSMR1 PRN   Administration





  INSOMNIA   


 


Eucalyptus/Menthol/Phenol/Sorbitol  1 each 02/08/17 14:12  





  Cepastat Lozenge -  MM   





  Q4H PRN   





  SORE THROAT   


 


Guaifenesin  10 ml 02/08/17 14:12  





  Robitussin Dm -  PO   





  Q6H PRN   





  COUGH   


 


Hydroxyzine Pamoate  50 mg 02/08/17 14:12 02/24/17 21:39





  Vistaril -  PO   50 mg





  Q4H PRN   Administration





  AGITATION   


 


Ibuprofen  400 mg 02/08/17 14:12 02/18/17 10:10





  Motrin -  PO   400 mg





  Q6H PRN   Administration





  SEVERE PAIN   


 


Loperamide HCl  4 mg 02/08/17 14:12  





  Imodium -  PO   





  Q6H PRN   





  DIARRHEA   


 


Magnesium Citrate  300 ml 02/08/17 14:12  





  Citroma -  PO   





  Q48H PRN   





  CONSTIPATION   


 


Magnesium Hydroxide  30 ml 02/08/17 14:12  





  Milk Of Magnesia -  PO   





  DAILY PRN   





  CONSTIPATION   


 


Methyl Salicylate  1 applic 02/08/17 22:00 02/26/17 22:37





  Jet-Leos -  TP   Not Given





  BID ASHUTOSH   


 


Naltrexone HCl  50 mg 02/23/17 10:45 02/26/17 09:56





  Revia -  PO   Not Given





  DAILY ASHUTOSH   


 


Nicotine Polacrilex  2 mg 02/08/17 14:12  





  Nicorette Gum -  BUC   





  Q2H PRN   





  NICOTINE REPLACEMENT RX   


 


Prenatal Multivit/Folic Acid/Iron  1 tab 02/09/17 10:00 02/26/17 09:56





  Prenatal Vitamins (Sjr) -  PO   1 tab





  DAILY ASHUTOSH   Administration


 


Pseudoephedrine/Triprolidine  1 combo 02/08/17 14:12  





  Actifed -  PO   





  TID PRN   





  NASAL CONGESTION   


 


Thiamine HCl  100 mg 02/08/17 22:00 02/26/17 21:50





  Vitamin B1 -  PO   100 mg





  HS ASHUTOSH   Administration


 


Trazodone HCl  100 mg 02/10/17 22:00 02/13/17 22:01





  Desyrel -  PO   100 mg





  HS PRN   Administration





  INSOMNIA   











Medication(s) Change(s): None.The patient is offered script for naltrexone.He 

declines.Mr Santiago informs this writer of his intention to ignore script for 

trazodone (already sent to pharmacy on 2/23/17 by his psychiatrist,Dr De Paz).

" I no longer want to take that medication.It makes me feel drowzy in the 

morning." No other side effect reported.No complaint of erectile abnormalities (

patient indicates his awareness about potential for priapism).


Current Side Effect: No


Lab tests ordered: No


Lab tests reviewed: Yes


Provider note:: Patient has completed his allocated days of rehabilitation 

treatment.He has addressed his issues of substance dependence (opioid,nicotine) 

and met his treatment goals.Mr Santiago will continue to address his addictions at 

the [facility]) drug program located in [facility].He endorses stable mood,

adequate energy level,improved sleep architecture and a revigorated motivation 

for maintenance of sobriety.Patient attributes his improvement to the efficacy 

of teachings dispensed at [address] and he verbalizes his plan to 

utilize more mature coping techniques in his day-to-day management of stress.Mr Santiago credits the [facility] team for deepening his insight into the multiple 

life domains that can be negatively affected by the continuous abuse of 

substances (which includes but not limited to emotional,vocational,legal,

medical and social spheres).Patient states that he will set his energies toward 

maintenance of wellness through adherence to outpatient care,avoidance of 

triggers and incorporation of spiritual assets in his activities of daily 

living.Appears motivated for the preservation of his therapeutic gains.No acute 

medical issues.Hospital course has been uneventful.Patient is currently at his 

baseline level of functioning.Mental status remains unremarkable (see report)

.Mr Santiago is stable for discharge as scheduled,on this date (2/27/17).Discussed 

with ,Elayne Mckeon.


Total face to face time:: 35





Mental Status Exam





- Mental Status Exam


Alert and Oriented to: Time, Place, Person


Cognitive Function: Good


Patient Appearance: Well Groomed


Mood: Hopeful, Happy, Euthymic


Affect: Appropriate, Normal Range


Patient Behavior: Appropriate, Cooperative


Speech Pattern: Clear, Appropriate


Voice Loudness: Normal


Thought Process: Intact, Goal Oriented


Thought Disorder: Not Present


Hallucinations: Denies


Suicidal Ideation: Denies


Homicidal Ideation: Denies


Insight/Judgement: Fair


Sleep: Well


Appetite: Good


Muscle strength/Tone: Normal


Gait/Station: Normal





Psychiatric Treatment Plan





- Problem List


(1) Opioid dependence





Comment: .








(2) Nicotine dependence


Qualifiers: 


     Nicotine product type: cigarettes     Substance use status: uncomplicated 

       Qualified Code(s): F17.210 - Nicotine dependence, cigarettes, 

uncomplicated  


Comment: .








(3) Substance-induced sleep disorder


Comment: .








(4) Drug-induced mood disorder


Comment: .

## 2018-02-05 ENCOUNTER — HOSPITAL ENCOUNTER (INPATIENT)
Dept: HOSPITAL 74 - YASAS | Age: 58
LOS: 4 days | Discharge: HOME | DRG: 773 | End: 2018-02-09
Attending: INTERNAL MEDICINE | Admitting: INTERNAL MEDICINE
Payer: COMMERCIAL

## 2018-02-05 VITALS — BODY MASS INDEX: 21.1 KG/M2

## 2018-02-05 DIAGNOSIS — Z86.73: ICD-10-CM

## 2018-02-05 DIAGNOSIS — R00.1: ICD-10-CM

## 2018-02-05 DIAGNOSIS — Z87.898: ICD-10-CM

## 2018-02-05 DIAGNOSIS — M25.562: ICD-10-CM

## 2018-02-05 DIAGNOSIS — F11.23: Primary | ICD-10-CM

## 2018-02-05 DIAGNOSIS — G89.29: ICD-10-CM

## 2018-02-05 DIAGNOSIS — F17.213: ICD-10-CM

## 2018-02-05 LAB
APPEARANCE UR: CLEAR
BILIRUB UR STRIP.AUTO-MCNC: NEGATIVE MG/DL
COLOR UR: YELLOW
KETONES UR QL STRIP: NEGATIVE
LEUKOCYTE ESTERASE UR QL STRIP.AUTO: NEGATIVE
NITRITE UR QL STRIP: NEGATIVE
PH UR: 5 [PH] (ref 5–8)
PROT UR QL STRIP: NEGATIVE
PROT UR QL STRIP: NEGATIVE
RBC # UR STRIP: NEGATIVE /UL
SP GR UR: 1.02 (ref 1–1.03)
UROBILINOGEN UR STRIP-MCNC: NEGATIVE MG/DL (ref 0.2–1)

## 2018-02-05 PROCEDURE — HZ2ZZZZ DETOXIFICATION SERVICES FOR SUBSTANCE ABUSE TREATMENT: ICD-10-PCS | Performed by: SURGERY

## 2018-02-05 RX ADMIN — Medication SCH MG: at 22:26
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
## 2018-02-05 NOTE — EKG
Test Reason : 

Blood Pressure : ***/*** mmHG

Vent. Rate : 055 BPM     Atrial Rate : 055 BPM

   P-R Int : 170 ms          QRS Dur : 104 ms

    QT Int : 442 ms       P-R-T Axes : 081 071 074 degrees

   QTc Int : 422 ms

 

SINUS BRADYCARDIA

OTHERWISE NORMAL ECG

WHEN COMPARED WITH ECG OF 08-FEB-2017 15:37,

NO SIGNIFICANT CHANGE WAS FOUND

Confirmed by NATALIA MALCOLM MD ([id number]) on 2/5/2018 5:41:27 PM

 

Referred By:             Confirmed By:NATALIA MALCOLM MD

## 2018-02-06 LAB
ALBUMIN SERPL-MCNC: 4.2 G/DL (ref 3.4–5)
ALP SERPL-CCNC: 46 U/L (ref 45–117)
ALT SERPL-CCNC: 20 U/L (ref 12–78)
ANION GAP SERPL CALC-SCNC: 7 MMOL/L (ref 8–16)
AST SERPL-CCNC: 14 U/L (ref 15–37)
BILIRUB SERPL-MCNC: 0.4 MG/DL (ref 0.2–1)
BUN SERPL-MCNC: 12 MG/DL (ref 7–18)
CALCIUM SERPL-MCNC: 8.5 MG/DL (ref 8.5–10.1)
CHLORIDE SERPL-SCNC: 101 MMOL/L (ref 98–107)
CO2 SERPL-SCNC: 30 MMOL/L (ref 21–32)
CREAT SERPL-MCNC: 1.1 MG/DL (ref 0.7–1.3)
DEPRECATED RDW RBC AUTO: 13.7 % (ref 11.9–15.9)
GLUCOSE SERPL-MCNC: 108 MG/DL (ref 74–106)
HCT VFR BLD CALC: 41.5 % (ref 35.4–49)
HGB BLD-MCNC: 13.6 GM/DL (ref 11.7–16.9)
MCH RBC QN AUTO: 31.5 PG (ref 25.7–33.7)
MCHC RBC AUTO-ENTMCNC: 32.7 G/DL (ref 32–35.9)
MCV RBC: 96.4 FL (ref 80–96)
PLATELET # BLD AUTO: 194 K/MM3 (ref 134–434)
PMV BLD: 8.8 FL (ref 7.5–11.1)
POTASSIUM SERPLBLD-SCNC: 4.4 MMOL/L (ref 3.5–5.1)
PROT SERPL-MCNC: 7 G/DL (ref 6.4–8.2)
RBC # BLD AUTO: 4.31 M/MM3 (ref 4–5.6)
SODIUM SERPL-SCNC: 138 MMOL/L (ref 136–145)
WBC # BLD AUTO: 5.6 K/MM3 (ref 4–10)

## 2018-02-06 RX ADMIN — Medication SCH TAB: at 10:17

## 2018-02-06 RX ADMIN — Medication SCH MG: at 22:16

## 2018-02-06 NOTE — PN
BHS COWS





- Scale


Resting Pulse: 0= [place] 80 or Below


Sweatin= Chills/Flushing


Restless Observation: 3= Extraneous Movement


Pupil Size: 2= Moderately Dilated


Bone or Joint Aches: 1= Mild Discomfort


Runny Nose/ Eye Tearin= Nasal Congestion


GI Upset > 30mins: 0= None


Tremor Observation of Outstretched Hands: 1= Tremor Felt, Not Seen


Yawning Observation: 1= 1-2x During Session


Anxiety or Irritability: 1=Feels Anxious/Irritable


Goose Flesh Skin: 0=Smooth Skin


COWS Score: 11





BHS Progress Note (SOAP)


Subjective: 





SLIGHT ANXIETY,FATIGUE.REPORTS "BETTER" RE: WITHDRAWAL SX. 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


Objective: 





18 10:55


 Vital Signs











Temperature  96.7 F L  18 09:08


 


Pulse Rate  41 L  18 09:08


 


Respiratory Rate  18   18 09:08


 


Blood Pressure  99/60   18 09:08


 


O2 Sat by Pulse Oximetry (%)      








 Laboratory Last Values











WBC  5.6 K/mm3 (4.0-10.0)   18  05:50    


 


RBC  4.31 M/mm3 (4.00-5.60)   18  05:50    


 


Hgb  13.6 GM/dL (11.7-16.9)   18  05:50    


 


Hct  41.5 % (35.4-49)   18  05:50    


 


MCV  96.4 fl (80-96)  H  18  05:50    


 


MCH  31.5 pg (25.7-33.7)   18  05:50    


 


MCHC  32.7 g/dl (32.0-35.9)   18  05:50    


 


RDW  13.7 % (11.9-15.9)   18  05:50    


 


Plt Count  194 K/MM3 (134-434)   18  05:50    


 


MPV  8.8 fl (7.5-11.1)   18  05:50    


 


Sodium  138 mmol/L (136-145)   18  05:50    


 


Potassium  4.4 mmol/L (3.5-5.1)   18  05:50    


 


Chloride  101 mmol/L ()   18  05:50    


 


Urine Color  Yellow   18  16:00    


 


Urine Appearance  Clear   18  16:00    


 


Urine pH  5.0  (5.0-8.0)  D 18  16:00    


 


Ur Specific Gravity  1.024  (1.001-1.035)   18  16:00    


 


Urine Protein  Negative  (NEGATIVE)   18  16:00    


 


Urine Glucose (UA)  Negative  (NEGATIVE)   18  16:00    


 


Urine Ketones  Negative  (NEGATIVE)   18  16:00    


 


Urine Blood  Negative  (NEGATIVE)   18  16:00    


 


Urine Nitrite  Negative  (NEGATIVE)   18  16:00    


 


Urine Bilirubin  Negative  (NEGATIVE)   18  16:00    


 


Urine Urobilinogen  Negative mg/dL (0.2-1.0)   18  16:00    


 


Ur Leukocyte Esterase  Negative  (NEGATIVE)   18  16:00    


 


Hepatitis C Antibody  <0.1 s/co ratio (0.0-0.9)   18  12:00    


 


HIV 1&2 Antibody Screen  Negative   18  12:00    


 


HIV P24 Antigen  Negative   18  12:00    








EKG; SINUS BRADYCARDIA OTHERWISE ALIDA ECG.


ASYMPTOMATIC





Assessment: 





18 10:55


WITHDRAWAL SX


Plan: 





CONTINUE DETOX


INCREASE PO FLUIDS.

## 2018-02-07 RX ADMIN — Medication SCH TAB: at 10:20

## 2018-02-07 RX ADMIN — Medication SCH MG: at 22:14

## 2018-02-07 NOTE — PN
BHS COWS





- Scale


Resting Pulse: 0= [place] 80 or Below


Sweatin= Chills/Flushing


Restless Observation: 3= Extraneous Movement


Pupil Size: 2= Moderately Dilated


Bone or Joint Aches: 4=Acute Joint/Muscle Pain


Runny Nose/ Eye Tearin= Nasal Congestion


GI Upset > 30mins: 0= None


Tremor Observation of Outstretched Hands: 1= Tremor Felt, Not Seen


Yawning Observation: 1= 1-2x During Session


Anxiety or Irritability: 2=Irritable/Anxious


Goose Flesh Skin: 0=Smooth Skin


COWS Score: 15





BHS Progress Note (SOAP)


Subjective: 





DECREASED ANXIETY,SWEATS. OOB AMBULATING. NAD.STABLE.


Objective: 





18 11:36


 Vital Signs











Temperature  97.3 F L  18 09:08


 


Pulse Rate  47 L  18 09:08


 


Respiratory Rate  18   18 09:08


 


Blood Pressure  99/61   18 09:08


 


O2 Sat by Pulse Oximetry (%)      








 Laboratory Last Values











WBC  5.6 K/mm3 (4.0-10.0)   18  05:50    


 


RBC  4.31 M/mm3 (4.00-5.60)   18  05:50    


 


Hgb  13.6 GM/dL (11.7-16.9)   18  05:50    


 


Hct  41.5 % (35.4-49)   18  05:50    


 


MCV  96.4 fl (80-96)  H  18  05:50    


 


MCH  31.5 pg (25.7-33.7)   18  05:50    


 


MCHC  32.7 g/dl (32.0-35.9)   18  05:50    


 


RDW  13.7 % (11.9-15.9)   18  05:50    


 


Plt Count  194 K/MM3 (134-434)   18  05:50    


 


MPV  8.8 fl (7.5-11.1)   18  05:50    


 


Sodium  138 mmol/L (136-145)   18  05:50    


 


Potassium  4.4 mmol/L (3.5-5.1)   18  05:50    


 


Chloride  101 mmol/L ()   18  05:50    


 


Carbon Dioxide  30 mmol/L (21-32)   18  05:50    


 


Anion Gap  7  (8-16)  L  18  05:50    


 


BUN  12 mg/dL (7-18)   18  05:50    


 


Creatinine  1.1 mg/dL (0.7-1.3)   18  05:50    


 


Creat Clearance w eGFR  > 60  (>60)   18  05:50    


 


Random Glucose  108 mg/dL ()  H D 18  05:50    


 


Calcium  8.5 mg/dL (8.5-10.1)   18  05:50    


 


Total Bilirubin  0.4 mg/dL (0.2-1.0)   18  05:50    


 


AST  14 U/L (15-37)  L  18  05:50    


 


ALT  20 U/L (12-78)   18  05:50    


 


Alkaline Phosphatase  46 U/L ()   18  05:50    


 


Total Protein  7.0 g/dl (6.4-8.2)   18  05:50    


 


Albumin  4.2 g/dl (3.4-5.0)   18  05:50    


 


Urine Color  Yellow   18  16:00    


 


Urine Appearance  Clear   18  16:00    


 


Urine pH  5.0  (5.0-8.0)  D 18  16:00    


 


Ur Specific Gravity  1.024  (1.001-1.035)   18  16:00    


 


Urine Protein  Negative  (NEGATIVE)   18  16:00    


 


Urine Glucose (UA)  Negative  (NEGATIVE)   18  16:00    


 


Urine Ketones  Negative  (NEGATIVE)   18  16:00    


 


Urine Blood  Negative  (NEGATIVE)   18  16:00    


 


Urine Nitrite  Negative  (NEGATIVE)   18  16:00    


 


Urine Bilirubin  Negative  (NEGATIVE)   18  16:00    


 


Urine Urobilinogen  Negative mg/dL (0.2-1.0)   18  16:00    


 


Ur Leukocyte Esterase  Negative  (NEGATIVE)   18  16:00    


 


RPR Titer  Nonreactive  (NONREACTIVE)   18  05:50    


 


Hepatitis C Antibody  <0.1 s/co ratio (0.0-0.9)   18  12:00    


 


HIV 1&2 Antibody Screen  Negative   18  12:00    


 


HIV P24 Antigen  Negative   18  12:00    











Assessment: 





18 11:37


WITHDRAWAL SX


Plan: 





CONTINUE DETOX

## 2018-02-08 RX ADMIN — Medication SCH MG: at 22:14

## 2018-02-08 RX ADMIN — Medication SCH TAB: at 10:16

## 2018-02-08 NOTE — PN
BHS Progress Note (SOAP)


Subjective: 





SLIGHT ANXIETY, SWEATS. LEFT KNEE PAIN DUE TO PREVIOUS INJURY. 


Objective: 





02/08/18 11:57


 Vital Signs











Temperature  97.4 F L  02/08/18 09:30


 


Pulse Rate  48 L  02/08/18 09:30


 


Respiratory Rate  18   02/08/18 09:30


 


Blood Pressure  92/57   02/08/18 09:30


 


O2 Sat by Pulse Oximetry (%)      








 Laboratory Last Values











WBC  5.6 K/mm3 (4.0-10.0)   02/06/18  05:50    


 


RBC  4.31 M/mm3 (4.00-5.60)   02/06/18  05:50    


 


Hgb  13.6 GM/dL (11.7-16.9)   02/06/18  05:50    


 


Hct  41.5 % (35.4-49)   02/06/18  05:50    


 


MCV  96.4 fl (80-96)  H  02/06/18  05:50    


 


MCH  31.5 pg (25.7-33.7)   02/06/18  05:50    


 


MCHC  32.7 g/dl (32.0-35.9)   02/06/18  05:50    


 


RDW  13.7 % (11.9-15.9)   02/06/18  05:50    


 


Plt Count  194 K/MM3 (134-434)   02/06/18  05:50    


 


MPV  8.8 fl (7.5-11.1)   02/06/18  05:50    


 


Sodium  138 mmol/L (136-145)   02/06/18  05:50    


 


Potassium  4.4 mmol/L (3.5-5.1)   02/06/18  05:50    


 


Chloride  101 mmol/L ()   02/06/18  05:50    


 


Carbon Dioxide  30 mmol/L (21-32)   02/06/18  05:50    


 


Anion Gap  7  (8-16)  L  02/06/18  05:50    


 


BUN  12 mg/dL (7-18)   02/06/18  05:50    


 


Creatinine  1.1 mg/dL (0.7-1.3)   02/06/18  05:50    


 


Creat Clearance w eGFR  > 60  (>60)   02/06/18  05:50    


 


Random Glucose  108 mg/dL ()  H D 02/06/18  05:50    


 


Calcium  8.5 mg/dL (8.5-10.1)   02/06/18  05:50    


 


Total Bilirubin  0.4 mg/dL (0.2-1.0)   02/06/18  05:50    


 


AST  14 U/L (15-37)  L  02/06/18  05:50    


 


ALT  20 U/L (12-78)   02/06/18  05:50    


 


Alkaline Phosphatase  46 U/L ()   02/06/18  05:50    


 


Total Protein  7.0 g/dl (6.4-8.2)   02/06/18  05:50    


 


Albumin  4.2 g/dl (3.4-5.0)   02/06/18  05:50    


 


Urine Color  Yellow   02/05/18  16:00    


 


Urine Appearance  Clear   02/05/18  16:00    


 


Urine pH  5.0  (5.0-8.0)  D 02/05/18  16:00    


 


Ur Specific Gravity  1.024  (1.001-1.035)   02/05/18  16:00    


 


Urine Protein  Negative  (NEGATIVE)   02/05/18  16:00    


 


Urine Glucose (UA)  Negative  (NEGATIVE)   02/05/18  16:00    


 


Urine Ketones  Negative  (NEGATIVE)   02/05/18  16:00    


 


Urine Blood  Negative  (NEGATIVE)   02/05/18  16:00    


 


Urine Nitrite  Negative  (NEGATIVE)   02/05/18  16:00    


 


Urine Bilirubin  Negative  (NEGATIVE)   02/05/18  16:00    


 


Urine Urobilinogen  Negative mg/dL (0.2-1.0)   02/05/18  16:00    


 


Ur Leukocyte Esterase  Negative  (NEGATIVE)   02/05/18  16:00    


 


RPR Titer  Nonreactive  (NONREACTIVE)   02/06/18  05:50    


 


Hepatitis C Antibody  <0.1 s/co ratio (0.0-0.9)   02/05/18  12:00    


 


HIV 1&2 Antibody Screen  Negative   02/05/18  12:00    


 


HIV P24 Antigen  Negative   02/05/18  12:00    











Assessment: 





02/08/18 11:57


WITHDRAWAL SX


Plan: 





CONTINUE DETOX


ACE BANDAGE AS DIRECTED


MOTRIN PRN FOR PAIN

## 2018-02-09 VITALS — HEART RATE: 50 BPM | TEMPERATURE: 96.8 F | SYSTOLIC BLOOD PRESSURE: 115 MMHG | DIASTOLIC BLOOD PRESSURE: 69 MMHG

## 2018-02-09 RX ADMIN — Medication SCH TAB: at 09:28

## 2018-02-09 NOTE — DS
[facility]HS Detox Discharge Summary


Admission Date: 


02/05/18





Discharge Date: 02/09/18





- History


Present History: Opioid Dependence


Additional Comments: 





DETOX COMPLETED. ALERT O X 3. REFERRED TO [facility] TODAY.


Pertinent Past History: 





SEE DX BELOW





- Physical Exam Results


Vital Signs: 


 Vital Signs











Temperature  96.8 F L  02/09/18 10:09


 


Pulse Rate  50 L  02/09/18 10:09


 


Respiratory Rate  18   02/09/18 10:09


 


Blood Pressure  115/69   02/09/18 10:09


 


O2 Sat by Pulse Oximetry (%)      











Pertinent Admission Physical Exam Findings: 





WITHDRAWAL SX





- Treatment


Hospital Course: Detox Protocol Followed, Detoxed Safely, Responded well, 

Discharged Condition Good, Rehab Referral Accepted


Patient has Accepted a Rehab Referral to: [facility]





- Medication


Discharge Medications: 


Ambulatory Orders





NK [No Known Home Medication]  02/05/18 











- Diagnosis


(1) Opioid dependence with withdrawal


Current Visit: Yes   Status: Acute   





(2) Chronic pain of left knee


Current Visit: Yes   Status: Chronic   





(3) Nicotine dependence


Current Visit: No   Status: Acute   


Qualifiers: 


   Nicotine product type: cigarettes   Substance use status: in withdrawal   

Qualified Code(s): F17.213 - Nicotine dependence, cigarettes, with withdrawal   





(4) Weight loss


Current Visit: Yes   Status: Chronic   





- AMA


Did Patient Leave Against Medical Advice: No

## 2018-07-21 ENCOUNTER — HOSPITAL ENCOUNTER (INPATIENT)
Dept: HOSPITAL 74 - YASAS | Age: 58
LOS: 5 days | Discharge: HOME | DRG: 773 | End: 2018-07-26
Attending: SURGERY | Admitting: SURGERY
Payer: COMMERCIAL

## 2018-07-21 VITALS — BODY MASS INDEX: 23.2 KG/M2

## 2018-07-21 DIAGNOSIS — F19.24: ICD-10-CM

## 2018-07-21 DIAGNOSIS — R63.4: ICD-10-CM
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
## 2018-07-21 DIAGNOSIS — F11.23: Primary | ICD-10-CM

## 2018-07-21 DIAGNOSIS — S89.92XS: ICD-10-CM

## 2018-07-21 DIAGNOSIS — F17.213: ICD-10-CM

## 2018-07-21 DIAGNOSIS — M25.562: ICD-10-CM

## 2018-07-21 DIAGNOSIS — F19.282: ICD-10-CM

## 2018-07-21 DIAGNOSIS — Z86.73: ICD-10-CM

## 2018-07-21 DIAGNOSIS — G89.29: ICD-10-CM

## 2018-07-21 LAB
APPEARANCE UR: (no result)
BILIRUB UR STRIP.AUTO-MCNC: NEGATIVE MG/DL
COLOR UR: YELLOW
KETONES UR QL STRIP: NEGATIVE
LEUKOCYTE ESTERASE UR QL STRIP.AUTO: NEGATIVE
NITRITE UR QL STRIP: NEGATIVE
PH UR: 5 [PH] (ref 5–8)
PROT UR QL STRIP: NEGATIVE
PROT UR QL STRIP: NEGATIVE
SP GR UR: 1.02 (ref 1–1.03)
UROBILINOGEN UR STRIP-MCNC: NEGATIVE MG/DL (ref 0.2–1)

## 2018-07-21 PROCEDURE — HZ2ZZZZ DETOXIFICATION SERVICES FOR SUBSTANCE ABUSE TREATMENT: ICD-10-PCS | Performed by: SURGERY

## 2018-07-21 RX ADMIN — Medication SCH MG: at 22:19

## 2018-07-21 NOTE — HP
COWS





- Scale


Resting Pulse: 0= [place] 80 or Below


Sweatin= Chills/Flushing


Restless Observation: 3= Extraneous Movement


Pupil Size: 2= Moderately Dilated


Bone or Joint Aches: 2= Severe Diffuse Aches


Runny Nose/ Eye Tearin= Runny Nose/Eyes


GI Upset > 30mins: 3= Vomiting/Diarrhea


Tremor Observation: 2= Slight Tremor Visible


Yawning Observation: 2= >3x During Session


Anxiety or Irritability: 2=Irritable/Anxious


Goose Flesh Skin: 0=Smooth Skin


COWS Score: 19





Admission ROS S





- HPI


Chief Complaint: 





i need help to stop using heroin


Allergies/Adverse Reactions: 


 Allergies











Allergy/AdvReac Type Severity Reaction Status Date / Time


 


No Known Allergies Allergy   Verified 18 11:55











History of Present Illness: 





this 57 years old male with heroin dependence,seeking detox,withdrawal symptom,

last detox  to 18


nicotine dependence multiple admissions in the past


keep relapsing


anxiety,depression,insomnia


no significant period of sobriety


Exam Limitations: No Limitations





- Ebola screening


Have you traveled outside of the country in the last 21 days: No (N)


Have you had contact with anyone from an Ebola affected area: No


Have you been sick,other than usual withdrawal symptoms: No


Do you have a fever: No





- Review of Systems


Constitutional: Chills, Diaphoresis, Loss of Appetite, Malaise, Night Sweats, 

Changes in sleep, Weakness, Unintentional Wgt. Loss


EENT: reports: Tearing, Nose Congestion


Respiratory: reports: No Symptoms reported


Cardiac: reports: No Symptoms Reported


GI: reports: Nausea, Vomiting, Abdominal cramping


: reports: No Symptoms Reported


Musculoskeletal: reports: Back Pain, Joint Pain, Muscle Pain, Joint Stiffness


Integumentary: reports: Dryness


Endocrine: reports: No Symptoms Reported


Hematology: reports: No Symptoms Reported


Psychiatric: reports: No Sypmtoms Reported, Judgement Intact, Mood/Affect 

Appropiate, Orientated x3, Anxious, Depressed (insomnia)





Patient History





- Patient Medical History


Hx Anemia: No


Hx Asthma: No


Hx Chronic Obstructive Pulmonary Disease (COPD): No


Hx Cancer: No


Hx Cardiac Disorders: No


Hx Congestive Heart Failure: No


Hx Hypertension: No


Hx Hypercholesterolemia: No


Hx Pacemaker: No


HX Cerebrovascular Accident: Yes (possible mild stroke 10yrs ago no residuals 

no )


Hx Seizures: No


Hx Dementia: No


Hx Diabetes: No


Hx Gastrointestinal Disorders: No


Hx Liver Disease: No


Hx Genitourinary Disorders: No


Hx Sexually Transmitted Disorders: No


Hx Renal Disease (ESRD): No


Hx Thyroid Disease: No


Hx Human Immunodeficiency Virus (HIV): No (18 negative)


Hx Hepatitis C: No


Hx Depression: No


Hx Suicide Attempt: No


Hx Bipolar Disorder: No


Hx Schizophrenia: No


Other Medical History: no suicidal,no homicidal





- Patient Surgical History


Past Surgical History: No


Hx Neurologic Surgery: No


Hx Cataract Extraction: No


Hx Cardiac Surgery: No


Hx Lung Surgery: No


Hx Breast Surgery: No


Hx Breast Biopsy: No


Hx Abdominal Surgery: No


Hx Appendectomy: No


Hx Cholecystectomy: No


Hx Genitourinary Surgery: No


Hx  Section: No


Hx Orthopedic Surgery: No


Anesthesia Reaction: No





- PPD History


Previous Implant?: Yes


Date: 18


Results: O MM


PPD to be Administered?: No





- Smoking Cessation


Smoking history: Current every day smoker


Aproximately how many cigarettes per day: 6


Hx Chewing Tobacco Use: No


Initiated information on smoking cessation: Yes


'Breaking Loose' booklet given: 18





- Substance & Tx. History


Hx Alcohol Use: No


Hx Substance Use: Yes


Substance Use Type: Heroin


Hx Substance Use Treatment: Yes ([facility] 18 to 18)





- Substances Abused


  ** Heroin


Route: Inhalation


Frequency: Daily


Amount used: 3 BAGS


Age of first use: 19


Date of Last Use: 18





Family Disease History





- Family Disease History


Family History: Denies





Admission Physical Exam [facility]





- Vital Signs


Vital Signs: 


 Vital Signs - 24 hr











  18





  15:54


 


Temperature 97.3 F L


 


Pulse Rate 54 L


 


Respiratory 16





Rate 


 


Blood Pressure 116/66














- Physical


General Appearance: Yes: Moderate Distress, Tremorous, Irritable, Sweating, 

Anxious


HEENTM: Yes: Normal ENT Inspection, ALESSANDRO, Pharynx Normal


Respiratory: Yes: Lungs Clear, Normal Breath Sounds, No Respiratory Distress


Neck: Yes: Within Normal Limits, Supple, Trachea in good position


Breast: Yes: Within Normal Limits


Cardiology: Yes: Within Normal Limits, Regular Rhythm, Regular Rate, S1, S2


Abdominal: Yes: Within Normal Limits, Soft


Genitourinary: Yes: Within Normal Limits


Back: Yes: Muscle Spasm


Musculoskeletal: Yes: full range of Motion, Back pain, Muscle Pain


Extremities: Yes: Within Normal Limits, Normal Range of Motion, Tremors


Neurological: Yes: CNs II-XII NML intact, Fully Oriented, Alert, Motor Strength 

5/5


Integumentary: Yes: Dry


Lymphatic: Yes: Within Normal Limits





- Diagnostic


(1) Left knee injury


Current Visit: No   Status: Acute   Comment: .   





(2) Nicotine dependence


Current Visit: No   Status: Acute   


Qualifiers: 


   Nicotine product type: cigarettes   Substance use status: in withdrawal   

Qualified Code(s): F17.213 - Nicotine dependence, cigarettes, with withdrawal   


Comment: .   





(3) Opioid dependence with withdrawal


Current Visit: No   Status: Acute   





(4) Chronic pain of left knee


Current Visit: No   Status: Chronic   Comment: .   





(5) Weight loss


Current Visit: No   Status: Chronic   





Cleared for Admission [facility]





- Detox or Rehab


[facility] Level of Care: Medically Managed


Detox Regimen/Protocol: Methadone





[facility] Breath Alcohol Content


Breath Alcohol Content: 0





Urine Drug Screen





- Results


Drug Screen Negative: No


Urine Drug Screen Results: THC-Marijuana, OPI-Opiates, TCA-Tricyclic Antidepress

, OXY-Oxycodone

## 2018-07-22 LAB
ALBUMIN SERPL-MCNC: 3 G/DL (ref 3.4–5)
ALP SERPL-CCNC: 47 U/L (ref 45–117)
ALT SERPL-CCNC: 16 U/L (ref 12–78)
ANION GAP SERPL CALC-SCNC: 2 MMOL/L (ref 8–16)
AST SERPL-CCNC: 14 U/L (ref 15–37)
BILIRUB SERPL-MCNC: 0.2 MG/DL (ref 0.2–1)
BUN SERPL-MCNC: 15 MG/DL (ref 7–18)
CALCIUM SERPL-MCNC: 8.1 MG/DL (ref 8.5–10.1)
CHLORIDE SERPL-SCNC: 103 MMOL/L (ref 98–107)
CO2 SERPL-SCNC: 35 MMOL/L (ref 21–32)
CREAT SERPL-MCNC: 0.8 MG/DL (ref 0.7–1.3)
DEPRECATED RDW RBC AUTO: 13.7 % (ref 11.9–15.9)
GLUCOSE SERPL-MCNC: 85 MG/DL (ref 74–106)
HCT VFR BLD CALC: 37.3 % (ref 35.4–49)
HGB BLD-MCNC: 12.4 GM/DL (ref 11.7–16.9)
MCH RBC QN AUTO: 32.2 PG (ref 25.7–33.7)
MCHC RBC AUTO-ENTMCNC: 33.3 G/DL (ref 32–35.9)
MCV RBC: 96.7 FL (ref 80–96)
PLATELET # BLD AUTO: 192 K/MM3 (ref 134–434)
PMV BLD: 8.3 FL (ref 7.5–11.1)
POTASSIUM SERPLBLD-SCNC: 3.9 MMOL/L (ref 3.5–5.1)
PROT SERPL-MCNC: 5.6 G/DL (ref 6.4–8.2)
RBC # BLD AUTO: 3.86 M/MM3 (ref 4–5.6)
SODIUM SERPL-SCNC: 140 MMOL/L (ref 136–145)
WBC # BLD AUTO: 4.6 K/MM3 (ref 4–10)

## 2018-07-22 RX ADMIN — CYCLOBENZAPRINE HYDROCHLORIDE PRN MG: 10 TABLET, FILM COATED ORAL at 10:33

## 2018-07-22 RX ADMIN — Medication SCH MG: at 22:06

## 2018-07-22 RX ADMIN — Medication SCH TAB: at 10:33

## 2018-07-22 NOTE — EKG
Test Reason : 

Blood Pressure : ***/*** mmHG

Vent. Rate : 056 BPM     Atrial Rate : 056 BPM

   P-R Int : 166 ms          QRS Dur : 086 ms

    QT Int : 444 ms       P-R-T Axes : 078 070 067 degrees

   QTc Int : 428 ms

 

SINUS BRADYCARDIA

ANTEROSEPTAL INFARCT , AGE UNDETERMINED

ABNORMAL ECG

WHEN COMPARED WITH ECG OF 05-FEB-2018 13:15,

ANTEROSEPTAL INFARCT IS NOW PRESENT

Confirmed by JESUS DIAZ, KANDIS ([id number]) on 7/22/2018 8:37:06 AM

 

Referred By:             Confirmed By:KANDIS LEE MD
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
## 2018-07-22 NOTE — PN
[facility] COWS





- Scale


Resting Pulse: 0= [place] 80 or Below


Sweatin= No chills or Flushing


Restless Observation: 1= Difficult to Sit Still


Pupil Size: 1= Pupils >than Normal


Bone or Joint Aches: 1= Mild Discomfort


Runny Nose/ Eye Tearin= None


GI Upset > 30mins: 0= None


Tremor Observation of Outstretched Hands: 0= None


Yawning Observation: 0= None


Anxiety or Irritability: 1=Feels Anxious/Irritable


Goose Flesh Skin: 0=Smooth Skin


COWS Score: 4





BHS Progress Note (SOAP)


Subjective: 





pt has no complaints


Objective: 





18 16:40


 Vital Signs - 24 hr











  18





  19:20 22:00 00:30


 


Temperature 97.6 F 98.5 F 


 


Pulse Rate 49 L 59 L 


 


Respiratory 18 18 18





Rate   


 


Blood Pressure 122/76 112/66 














  18





  03:30 06:30 07:06


 


Temperature   97.5 F L


 


Pulse Rate   49 L


 


Respiratory 18 18 18





Rate   


 


Blood Pressure   100/55














  18





  09:57 14:25


 


Temperature 97.0 F L 97.5 F L


 


Pulse Rate 60 60


 


Respiratory 18 16





Rate  


 


Blood Pressure 102/63 103/69








 Laboratory Tests











  18





  20:00 07:50 07:50


 


WBC    4.6


 


RBC    3.86 L


 


Hgb    12.4


 


Hct    37.3


 


MCV    96.7 H


 


MCH    32.2


 


MCHC    33.3


 


RDW    13.7


 


Plt Count    192


 


MPV    8.3


 


Sodium   


 


Potassium   


 


Chloride   


 


Carbon Dioxide   


 


Anion Gap   


 


BUN   


 


Creatinine   


 


Creat Clearance w eGFR   


 


Random Glucose   


 


Calcium   


 


Total Bilirubin   


 


AST   


 


ALT   


 


Alkaline Phosphatase   


 


Total Protein   


 


Albumin   


 


Urine Color  Yellow  


 


Urine Appearance  Slcloudy  


 


Urine pH  5.0  


 


Ur Specific Gravity  1.023  


 


Urine Protein  Negative  


 


Urine Glucose (UA)  Negative  


 


Urine Ketones  Negative  


 


Urine Blood  Negative  


 


Urine Nitrite  Negative  


 


Urine Bilirubin  Negative  


 


Urine Urobilinogen  Negative  


 


Ur Leukocyte Esterase  Negative  


 


RPR Titer   


 


HIV 1&2 Antibody Screen   Negative 


 


HIV P24 Antigen   Negative 














  18





  07:50 07:50


 


WBC  


 


RBC  


 


Hgb  


 


Hct  


 


MCV  


 


MCH  


 


MCHC  


 


RDW  


 


Plt Count  


 


MPV  


 


Sodium  140 


 


Potassium  3.9 


 


Chloride  103 


 


Carbon Dioxide  35 H 


 


Anion Gap  2 L 


 


BUN  15 


 


Creatinine  0.8 


 


Creat Clearance w eGFR  > 60 


 


Random Glucose  85  D 


 


Calcium  8.1 L 


 


Total Bilirubin  0.2 


 


AST  14 L 


 


ALT  16 


 


Alkaline Phosphatase  47 


 


Total Protein  5.6 L 


 


Albumin  3.0 L 


 


Urine Color  


 


Urine Appearance  


 


Urine pH  


 


Ur Specific Gravity  


 


Urine Protein  


 


Urine Glucose (UA)  


 


Urine Ketones  


 


Urine Blood  


 


Urine Nitrite  


 


Urine Bilirubin  


 


Urine Urobilinogen  


 


Ur Leukocyte Esterase  


 


RPR Titer   Nonreactive


 


HIV 1&2 Antibody Screen  


 


HIV P24 Antigen  








ess nl Vs


mild anemia


Assessment: 





18 16:40


this 57 years old male with heroin dependence,on methadone detox


Plan: 





continue meth detox. Pt has melatonin, ambien and vistaril for insomnia

## 2018-07-23 RX ADMIN — Medication SCH MG: at 22:29

## 2018-07-23 RX ADMIN — Medication SCH TAB: at 10:25

## 2018-07-23 NOTE — PN
[facility] Progress Note


Note: 





Psychiatry


Attending's note :


Approached by patient.


Declines to take trazodone.


" I got bad effects from it in the past."


Mr Jack endorses chronic insomnia.


Requests switch to seroquel.


Plan :


Seroquel 50 mg po hs.


Side effects/benefits discussed.


Consent (verbal) given.

## 2018-07-23 NOTE — PN
BHS COWS





- Scale


Resting Pulse: 0= [place] 80 or Below


Sweatin=Flushed/Facial Moisture


Restless Observation: 1= Difficult to Sit Still


Pupil Size: 0= Normal to Room Light


Bone or Joint Aches: 1= Mild Discomfort


Runny Nose/ Eye Tearin= Nasal Congestion


GI Upset > 30mins: 1= Stomach Cramp


Tremor Observation of Outstretched Hands: 2= Slight Tremor Visible


Yawning Observation: 1= 1-2x During Session


Anxiety or Irritability: 2=Irritable/Anxious


Goose Flesh Skin: 0=Smooth Skin


COWS Score: 11





BHS Progress Note (SOAP)


Subjective: 





shakes


sweats


sleep disturbance


Objective: 





18 17:00


A & O x 3


 Vital Signs











Temperature  98.6 F   18 13:37


 


Pulse Rate  61   18 13:37


 


Respiratory Rate  18   18 13:37


 


Blood Pressure  105/53   18 13:37


 


O2 Sat by Pulse Oximetry (%)      








 Laboratory Last Values











WBC  4.6 K/mm3 (4.0-10.0)   18  07:50    


 


RBC  3.86 M/mm3 (4.00-5.60)  L  18  07:50    


 


Hgb  12.4 GM/dL (11.7-16.9)   18  07:50    


 


Hct  37.3 % (35.4-49)   18  07:50    


 


MCV  96.7 fl (80-96)  H  18  07:50    


 


MCH  32.2 pg (25.7-33.7)   18  07:50    


 


MCHC  33.3 g/dl (32.0-35.9)   18  07:50    


 


RDW  13.7 % (11.9-15.9)   18  07:50    


 


Plt Count  192 K/MM3 (134-434)   18  07:50    


 


MPV  8.3 fl (7.5-11.1)   18  07:50    


 


Sodium  140 mmol/L (136-145)   18  07:50    


 


Potassium  3.9 mmol/L (3.5-5.1)   18  07:50    


 


Chloride  103 mmol/L ()   18  07:50    


 


Carbon Dioxide  35 mmol/L (21-32)  H  18  07:50    


 


Anion Gap  2  (8-16)  L  18  07:50    


 


BUN  15 mg/dL (7-18)   18  07:50    


 


Creatinine  0.8 mg/dL (0.7-1.3)   18  07:50    


 


Creat Clearance w eGFR  > 60  (>60)   18  07:50    


 


Random Glucose  85 mg/dL ()  D 18  07:50    


 


Calcium  8.1 mg/dL (8.5-10.1)  L  18  07:50    


 


Total Bilirubin  0.2 mg/dL (0.2-1.0)   18  07:50    


 


AST  14 U/L (15-37)  L  18  07:50    


 


ALT  16 U/L (12-78)   18  07:50    


 


Alkaline Phosphatase  47 U/L ()   18  07:50    


 


Total Protein  5.6 g/dl (6.4-8.2)  L  18  07:50    


 


Albumin  3.0 g/dl (3.4-5.0)  L  18  07:50    


 


Urine Color  Yellow   18  20:00    


 


Urine Appearance  Slcloudy   18  20:00    


 


Urine pH  5.0  (5.0-8.0)   18  20:00    


 


Ur Specific Gravity  1.023  (1.001-1.035)   18  20:00    


 


Urine Protein  Negative  (NEGATIVE)   18  20:00    


 


Urine Glucose (UA)  Negative  (NEGATIVE)   18  20:00    


 


Urine Ketones  Negative  (NEGATIVE)   18  20:00    


 


Urine Blood  Negative  (NEGATIVE)   18  20:00    


 


Urine Nitrite  Negative  (NEGATIVE)   18  20:00    


 


Urine Bilirubin  Negative  (<2.0 mg/dL)   18  20:00    


 


Urine Urobilinogen  Negative mg/dL (0.2-1.0)   18  20:00    


 


Ur Leukocyte Esterase  Negative  (NEGATIVE)   18  20:00    


 


RPR Titer  Nonreactive  (NONREACTIVE)   18  07:50    


 


HIV 1&2 Antibody Screen  Negative   18  07:50    


 


HIV P24 Antigen  Negative   18  07:50    











Assessment: 





18 17:01


withdrawal sx


Plan: 





continue detox

## 2018-07-24 RX ADMIN — Medication SCH MG: at 22:39

## 2018-07-24 RX ADMIN — Medication SCH TAB: at 10:43

## 2018-07-24 NOTE — PN
BHS Progress Note (SOAP)


Subjective: 





Interrupted Sleep, Fatigue.


Objective: 


PATIENT A & O X 3, OBSERVED AMBULATING ON UNIT. NO ACUTE DISTRESS.





07/24/18 14:30


 Vital Signs











Temperature  97.5 F L  07/24/18 13:17


 


Pulse Rate  51 L  07/24/18 13:17


 


Respiratory Rate  18   07/24/18 13:17


 


Blood Pressure  107/67   07/24/18 13:17


 


O2 Sat by Pulse Oximetry (%)      








 Laboratory Tests











  07/21/18 07/22/18 07/22/18





  20:00 07:50 07:50


 


WBC    4.6


 


RBC    3.86 L


 


Hgb    12.4


 


Hct    37.3


 


MCV    96.7 H


 


MCH    32.2


 


MCHC    33.3


 


RDW    13.7


 


Plt Count    192


 


MPV    8.3


 


Sodium   


 


Potassium   


 


Chloride   


 


Carbon Dioxide   


 


Anion Gap   


 


BUN   


 


Creatinine   


 


Creat Clearance w eGFR   


 


Random Glucose   


 


Calcium   


 


Total Bilirubin   


 


AST   


 


ALT   


 


Alkaline Phosphatase   


 


Total Protein   


 


Albumin   


 


Urine Color  Yellow  


 


Urine Appearance  Slcloudy  


 


Urine pH  5.0  


 


Ur Specific Gravity  1.023  


 


Urine Protein  Negative  


 


Urine Glucose (UA)  Negative  


 


Urine Ketones  Negative  


 


Urine Blood  Negative  


 


Urine Nitrite  Negative  


 


Urine Bilirubin  Negative  


 


Urine Urobilinogen  Negative  


 


Ur Leukocyte Esterase  Negative  


 


RPR Titer   


 


HIV 1&2 Antibody Screen   Negative 


 


HIV P24 Antigen   Negative 














  07/22/18 07/22/18





  07:50 07:50


 


WBC  


 


RBC  


 


Hgb  


 


Hct  


 


MCV  


 


MCH  


 


MCHC  


 


RDW  


 


Plt Count  


 


MPV  


 


Sodium  140 


 


Potassium  3.9 


 


Chloride  103 


 


Carbon Dioxide  35 H 


 


Anion Gap  2 L 


 


BUN  15 


 


Creatinine  0.8 


 


Creat Clearance w eGFR  > 60 


 


Random Glucose  85  D 


 


Calcium  8.1 L 


 


Total Bilirubin  0.2 


 


AST  14 L 


 


ALT  16 


 


Alkaline Phosphatase  47 


 


Total Protein  5.6 L 


 


Albumin  3.0 L 


 


Urine Color  


 


Urine Appearance  


 


Urine pH  


 


Ur Specific Gravity  


 


Urine Protein  


 


Urine Glucose (UA)  


 


Urine Ketones  


 


Urine Blood  


 


Urine Nitrite  


 


Urine Bilirubin  


 


Urine Urobilinogen  


 


Ur Leukocyte Esterase  


 


RPR Titer   Nonreactive


 


HIV 1&2 Antibody Screen  


 


HIV P24 Antigen  








LABS NOTED.


Assessment: 





07/24/18 14:31


WITHDRAWAL SYMPTOMS.


Plan: 





CONTINUE DETOX.


INCREASE DAILY PO FLUID INTAKE.

## 2018-07-25 RX ADMIN — CYCLOBENZAPRINE HYDROCHLORIDE PRN MG: 10 TABLET, FILM COATED ORAL at 23:22

## 2018-07-25 RX ADMIN — Medication SCH MG: at 23:22

## 2018-07-25 RX ADMIN — Medication SCH TAB: at 10:35

## 2018-07-25 NOTE — PN
BHS Progress Note (SOAP)


Subjective: 





Fatigue, Constipation.


Objective: 


PATIENT A & O X 3. NO ACUTE DISTRESS.





07/25/18 13:30


 Vital Signs











Temperature  97.2 F L  07/25/18 13:21


 


Pulse Rate  57 L  07/25/18 13:21


 


Respiratory Rate  18   07/25/18 13:21


 


Blood Pressure  104/74   07/25/18 13:21


 


O2 Sat by Pulse Oximetry (%)      








 Laboratory Tests











  07/21/18 07/22/18 07/22/18





  20:00 07:50 07:50


 


WBC    4.6


 


RBC    3.86 L


 


Hgb    12.4


 


Hct    37.3


 


MCV    96.7 H


 


MCH    32.2


 


MCHC    33.3


 


RDW    13.7


 


Plt Count    192


 


MPV    8.3


 


Sodium   


 


Potassium   


 


Chloride   


 


Carbon Dioxide   


 


Anion Gap   


 


BUN   


 


Creatinine   


 


Creat Clearance w eGFR   


 


Random Glucose   


 


Calcium   


 


Total Bilirubin   


 


AST   


 


ALT   


 


Alkaline Phosphatase   


 


Total Protein   


 


Albumin   


 


Urine Color  Yellow  


 


Urine Appearance  Slcloudy  


 


Urine pH  5.0  


 


Ur Specific Gravity  1.023  


 


Urine Protein  Negative  


 


Urine Glucose (UA)  Negative  


 


Urine Ketones  Negative  


 


Urine Blood  Negative  


 


Urine Nitrite  Negative  


 


Urine Bilirubin  Negative  


 


Urine Urobilinogen  Negative  


 


Ur Leukocyte Esterase  Negative  


 


RPR Titer   


 


HIV 1&2 Antibody Screen   Negative 


 


HIV P24 Antigen   Negative 














  07/22/18 07/22/18





  07:50 07:50


 


WBC  


 


RBC  


 


Hgb  


 


Hct  


 


MCV  


 


MCH  


 


MCHC  


 


RDW  


 


Plt Count  


 


MPV  


 


Sodium  140 


 


Potassium  3.9 


 


Chloride  103 


 


Carbon Dioxide  35 H 


 


Anion Gap  2 L 


 


BUN  15 


 


Creatinine  0.8 


 


Creat Clearance w eGFR  > 60 


 


Random Glucose  85  D 


 


Calcium  8.1 L 


 


Total Bilirubin  0.2 


 


AST  14 L 


 


ALT  16 


 


Alkaline Phosphatase  47 


 


Total Protein  5.6 L 


 


Albumin  3.0 L 


 


Urine Color  


 


Urine Appearance  


 


Urine pH  


 


Ur Specific Gravity  


 


Urine Protein  


 


Urine Glucose (UA)  


 


Urine Ketones  


 


Urine Blood  


 


Urine Nitrite  


 


Urine Bilirubin  


 


Urine Urobilinogen  


 


Ur Leukocyte Esterase  


 


RPR Titer   Nonreactive


 


HIV 1&2 Antibody Screen  


 


HIV P24 Antigen  








LABS NOTED.


Assessment: 





07/25/18 13:31


WITHDRAWAL SYMPTOMS.


Plan: 





CONTINUE DETOX.


INCREASE DAILY PO FLUID INTAKE.


PRN MOM FOR CONSTIPATION.





PATIENT SCHEDULED FOR D/C TOMORROW.

## 2018-07-26 VITALS — HEART RATE: 59 BPM | SYSTOLIC BLOOD PRESSURE: 99 MMHG | TEMPERATURE: 97.2 F | DIASTOLIC BLOOD PRESSURE: 62 MMHG

## 2018-07-26 NOTE — DS
[facility]HS Detox Discharge Summary


Admission Date: 


07/21/18





Discharge Date: 07/26/18





- History


Present History: Opioid Dependence


Additional Comments: 





PATIENT MOVING BACK TO [place] AFTER DISCHARGE FROM DETOX UNIT. PATIENT 

ADVISED TO CONSIDER LOCAL  12-STEP / NA OUTPATIENT SUPPORT GROUPS ONCE THERE. 

PATIENT LEFT DETOX UNIT IN STABLE MEDICAL CONDITION.


Pertinent Past History: 





Nicotine Dependence, History of Left Knee Injury, Chronic Pain of Left Knee, 

History of CVA, Weight Loss.





- Physical Exam Results


Vital Signs: 


 Vital Signs











Temperature  97.2 F L  07/26/18 06:02


 


Pulse Rate  59 L  07/26/18 06:02


 


Respiratory Rate  18   07/26/18 06:02


 


Blood Pressure  99/62   07/26/18 06:02


 


O2 Sat by Pulse Oximetry (%)      











Pertinent Admission Physical Exam Findings: 





WITHDRAWAL SYMPTOMS.





 Vital Signs











Temperature  97.2 F L  07/26/18 06:02


 


Pulse Rate  59 L  07/26/18 06:02


 


Respiratory Rate  18   07/26/18 06:02


 


Blood Pressure  99/62   07/26/18 06:02


 


O2 Sat by Pulse Oximetry (%)      








 Laboratory Tests











  07/21/18 07/22/18 07/22/18





  20:00 07:50 07:50


 


WBC    4.6


 


RBC    3.86 L


 


Hgb    12.4


 


Hct    37.3


 


MCV    96.7 H


 


MCH    32.2


 


MCHC    33.3


 


RDW    13.7


 


Plt Count    192


 


MPV    8.3


 


Sodium   


 


Potassium   


 


Chloride   


 


Carbon Dioxide   


 


Anion Gap   


 


BUN   


 


Creatinine   


 


Creat Clearance w eGFR   


 


Random Glucose   


 


Calcium   


 


Total Bilirubin   


 


AST   


 


ALT   


 


Alkaline Phosphatase   


 


Total Protein   


 


Albumin   


 


Urine Color  Yellow  


 


Urine Appearance  Slcloudy  


 


Urine pH  5.0  


 


Ur Specific Gravity  1.023  


 


Urine Protein  Negative  


 


Urine Glucose (UA)  Negative  


 


Urine Ketones  Negative  


 


Urine Blood  Negative  


 


Urine Nitrite  Negative  


 


Urine Bilirubin  Negative  


 


Urine Urobilinogen  Negative  


 


Ur Leukocyte Esterase  Negative  


 


RPR Titer   


 


HIV 1&2 Antibody Screen   Negative 


 


HIV P24 Antigen   Negative 














  07/22/18 07/22/18





  07:50 07:50


 


WBC  


 


RBC  


 


Hgb  


 


Hct  


 


MCV  


 


MCH  


 


MCHC  


 


RDW  


 


Plt Count  


 


MPV  


 


Sodium  140 


 


Potassium  3.9 


 


Chloride  103 


 


Carbon Dioxide  35 H 


 


Anion Gap  2 L 


 


BUN  15 


 


Creatinine  0.8 


 


Creat Clearance w eGFR  > 60 


 


Random Glucose  85  D 


 


Calcium  8.1 L 


 


Total Bilirubin  0.2 


 


AST  14 L 


 


ALT  16 


 


Alkaline Phosphatase  47 


 


Total Protein  5.6 L 


 


Albumin  3.0 L 


 


Urine Color  


 


Urine Appearance  


 


Urine pH  


 


Ur Specific Gravity  


 


Urine Protein  


 


Urine Glucose (UA)  


 


Urine Ketones  


 


Urine Blood  


 


Urine Nitrite  


 


Urine Bilirubin  


 


Urine Urobilinogen  


 


Ur Leukocyte Esterase  


 


RPR Titer   Nonreactive


 


HIV 1&2 Antibody Screen  


 


HIV P24 Antigen  








LABS NOTED.





- Treatment


Hospital Course: Detox Protocol Followed, Detoxed Safely, Responded well, 

Discharged Condition Good


Patient has Accepted a Rehab Referral to: PT MOVING BACK TO [place], 

ADVISED TO CONSIDER LOCAL 12-STEP/NA GROUPS.





- Medication


Discharge Medications: 


Ambulatory Orders





Quetiapine Fumarate [Seroquel -] 50 mg PO HS #30 tablet 07/24/18 











- Diagnosis


(1) Opioid dependence with withdrawal


Status: Acute   





(2) Left knee injury


Status: Chronic   


Qualifiers: 


   Encounter type: sequela   Qualified Code(s): S89.92XS - Unspecified injury 

of left lower leg, sequela   





(3) Nicotine dependence


Status: Chronic   


Qualifiers: 


   Nicotine product type: cigarettes   Substance use status: in withdrawal   

Qualified Code(s): F17.213 - Nicotine dependence, cigarettes, with withdrawal   





(4) Weight loss


Status: Chronic   





(5) Chronic pain of left knee


Status: Chronic   





(6) Substance-induced sleep disorder


Status: Chronic   





- AMA


Did Patient Leave Against Medical Advice: No

## 2018-07-26 NOTE — PN
[facility]HS Progress Note (SOAP)


Subjective: 





Patient denies current Detox symptoms and reports that he feels well overall.


Objective: 


PATIENT A & O X 3, OBSERVED AMBULATING ON UNIT. NO ACUTE DISTRESS.


 Vital Signs











Temperature  97.2 F L  07/26/18 06:02


 


Pulse Rate  59 L  07/26/18 06:02


 


Respiratory Rate  18   07/26/18 06:02


 


Blood Pressure  99/62   07/26/18 06:02


 


O2 Sat by Pulse Oximetry (%)      











07/26/18 20:56


 Laboratory Tests











  07/21/18 07/22/18 07/22/18





  20:00 07:50 07:50


 


WBC    4.6


 


RBC    3.86 L


 


Hgb    12.4


 


Hct    37.3


 


MCV    96.7 H


 


MCH    32.2


 


MCHC    33.3


 


RDW    13.7


 


Plt Count    192


 


MPV    8.3


 


Sodium   


 


Potassium   


 


Chloride   


 


Carbon Dioxide   


 


Anion Gap   


 


BUN   


 


Creatinine   


 


Creat Clearance w eGFR   


 


Random Glucose   


 


Calcium   


 


Total Bilirubin   


 


AST   


 


ALT   


 


Alkaline Phosphatase   


 


Total Protein   


 


Albumin   


 


Urine Color  Yellow  


 


Urine Appearance  Slcloudy  


 


Urine pH  5.0  


 


Ur Specific Gravity  1.023  


 


Urine Protein  Negative  


 


Urine Glucose (UA)  Negative  


 


Urine Ketones  Negative  


 


Urine Blood  Negative  


 


Urine Nitrite  Negative  


 


Urine Bilirubin  Negative  


 


Urine Urobilinogen  Negative  


 


Ur Leukocyte Esterase  Negative  


 


RPR Titer   


 


HIV 1&2 Antibody Screen   Negative 


 


HIV P24 Antigen   Negative 














  07/22/18 07/22/18





  07:50 07:50


 


WBC  


 


RBC  


 


Hgb  


 


Hct  


 


MCV  


 


MCH  


 


MCHC  


 


RDW  


 


Plt Count  


 


MPV  


 


Sodium  140 


 


Potassium  3.9 


 


Chloride  103 


 


Carbon Dioxide  35 H 


 


Anion Gap  2 L 


 


BUN  15 


 


Creatinine  0.8 


 


Creat Clearance w eGFR  > 60 


 


Random Glucose  85  D 


 


Calcium  8.1 L 


 


Total Bilirubin  0.2 


 


AST  14 L 


 


ALT  16 


 


Alkaline Phosphatase  47 


 


Total Protein  5.6 L 


 


Albumin  3.0 L 


 


Urine Color  


 


Urine Appearance  


 


Urine pH  


 


Ur Specific Gravity  


 


Urine Protein  


 


Urine Glucose (UA)  


 


Urine Ketones  


 


Urine Blood  


 


Urine Nitrite  


 


Urine Bilirubin  


 


Urine Urobilinogen  


 


Ur Leukocyte Esterase  


 


RPR Titer   Nonreactive


 


HIV 1&2 Antibody Screen  


 


HIV P24 Antigen  








LABS NOTED.


Assessment: 





07/26/18 20:56


COMPLETION OF DETOX REGIMEN.


Plan: 





PATIENT SCHEDULED FOR DISCHARGE FROM DETOX UNIT TODAY.